# Patient Record
Sex: MALE | Race: WHITE | NOT HISPANIC OR LATINO | URBAN - METROPOLITAN AREA
[De-identification: names, ages, dates, MRNs, and addresses within clinical notes are randomized per-mention and may not be internally consistent; named-entity substitution may affect disease eponyms.]

---

## 2017-06-26 ENCOUNTER — OUTPATIENT (OUTPATIENT)
Dept: OUTPATIENT SERVICES | Facility: HOSPITAL | Age: 27
LOS: 1 days | Discharge: HOME | End: 2017-06-26

## 2017-06-26 DIAGNOSIS — E03.9 HYPOTHYROIDISM, UNSPECIFIED: ICD-10-CM

## 2017-06-26 DIAGNOSIS — A69.20 LYME DISEASE, UNSPECIFIED: ICD-10-CM

## 2017-06-26 DIAGNOSIS — Z00.00 ENCOUNTER FOR GENERAL ADULT MEDICAL EXAMINATION WITHOUT ABNORMAL FINDINGS: ICD-10-CM

## 2017-06-26 DIAGNOSIS — D64.9 ANEMIA, UNSPECIFIED: ICD-10-CM

## 2017-06-26 DIAGNOSIS — Z01.89 ENCOUNTER FOR OTHER SPECIFIED SPECIAL EXAMINATIONS: ICD-10-CM

## 2017-06-26 DIAGNOSIS — E11.69 TYPE 2 DIABETES MELLITUS WITH OTHER SPECIFIED COMPLICATION: ICD-10-CM

## 2017-06-26 DIAGNOSIS — M25.50 PAIN IN UNSPECIFIED JOINT: ICD-10-CM

## 2017-06-26 DIAGNOSIS — N39.0 URINARY TRACT INFECTION, SITE NOT SPECIFIED: ICD-10-CM

## 2017-06-26 DIAGNOSIS — R97.20 ELEVATED PROSTATE SPECIFIC ANTIGEN [PSA]: ICD-10-CM

## 2017-06-26 DIAGNOSIS — E87.8 OTHER DISORDERS OF ELECTROLYTE AND FLUID BALANCE, NOT ELSEWHERE CLASSIFIED: ICD-10-CM

## 2017-06-26 DIAGNOSIS — E55.9 VITAMIN D DEFICIENCY, UNSPECIFIED: ICD-10-CM

## 2017-06-26 DIAGNOSIS — E78.5 HYPERLIPIDEMIA, UNSPECIFIED: ICD-10-CM

## 2017-06-26 DIAGNOSIS — E75.5 OTHER LIPID STORAGE DISORDERS: ICD-10-CM

## 2017-06-26 DIAGNOSIS — I70.91 GENERALIZED ATHEROSCLEROSIS: ICD-10-CM

## 2018-07-04 ENCOUNTER — EMERGENCY (EMERGENCY)
Facility: HOSPITAL | Age: 28
LOS: 1 days | Discharge: HOME | End: 2018-07-04
Attending: EMERGENCY MEDICINE | Admitting: EMERGENCY MEDICINE
Payer: COMMERCIAL

## 2018-07-04 VITALS
OXYGEN SATURATION: 98 % | HEART RATE: 90 BPM | DIASTOLIC BLOOD PRESSURE: 70 MMHG | RESPIRATION RATE: 20 BRPM | TEMPERATURE: 98 F | SYSTOLIC BLOOD PRESSURE: 137 MMHG

## 2018-07-04 VITALS
WEIGHT: 184.97 LBS | HEART RATE: 105 BPM | RESPIRATION RATE: 18 BRPM | SYSTOLIC BLOOD PRESSURE: 135 MMHG | DIASTOLIC BLOOD PRESSURE: 64 MMHG | TEMPERATURE: 97 F | HEIGHT: 60 IN | OXYGEN SATURATION: 100 %

## 2018-07-04 LAB
ALBUMIN SERPL ELPH-MCNC: 4.9 G/DL — SIGNIFICANT CHANGE UP (ref 3.3–5.2)
ALP SERPL-CCNC: 60 U/L — SIGNIFICANT CHANGE UP (ref 40–120)
ALT FLD-CCNC: 96 U/L — HIGH
ANION GAP SERPL CALC-SCNC: 18 MMOL/L — HIGH (ref 5–17)
AST SERPL-CCNC: 37 U/L — SIGNIFICANT CHANGE UP
BASOPHILS # BLD AUTO: 0 K/UL — SIGNIFICANT CHANGE UP (ref 0–0.2)
BASOPHILS NFR BLD AUTO: 0.1 % — SIGNIFICANT CHANGE UP (ref 0–2)
BILIRUB SERPL-MCNC: <0.2 MG/DL — LOW (ref 0.4–2)
BUN SERPL-MCNC: 11 MG/DL — SIGNIFICANT CHANGE UP (ref 8–20)
CALCIUM SERPL-MCNC: 9.2 MG/DL — SIGNIFICANT CHANGE UP (ref 8.6–10.2)
CHLORIDE SERPL-SCNC: 104 MMOL/L — SIGNIFICANT CHANGE UP (ref 98–107)
CO2 SERPL-SCNC: 18 MMOL/L — LOW (ref 22–29)
CREAT SERPL-MCNC: 0.69 MG/DL — SIGNIFICANT CHANGE UP (ref 0.5–1.3)
EOSINOPHIL # BLD AUTO: 0 K/UL — SIGNIFICANT CHANGE UP (ref 0–0.5)
EOSINOPHIL NFR BLD AUTO: 0.2 % — SIGNIFICANT CHANGE UP (ref 0–5)
ETHANOL SERPL-MCNC: 155 MG/DL — SIGNIFICANT CHANGE UP
GLUCOSE SERPL-MCNC: 113 MG/DL — SIGNIFICANT CHANGE UP (ref 70–115)
HCT VFR BLD CALC: 46.7 % — SIGNIFICANT CHANGE UP (ref 42–52)
HGB BLD-MCNC: 16.6 G/DL — SIGNIFICANT CHANGE UP (ref 14–18)
LYMPHOCYTES # BLD AUTO: 1.8 K/UL — SIGNIFICANT CHANGE UP (ref 1–4.8)
LYMPHOCYTES # BLD AUTO: 20 % — SIGNIFICANT CHANGE UP (ref 20–55)
MCHC RBC-ENTMCNC: 32.2 PG — HIGH (ref 27–31)
MCHC RBC-ENTMCNC: 35.5 G/DL — SIGNIFICANT CHANGE UP (ref 32–36)
MCV RBC AUTO: 90.5 FL — SIGNIFICANT CHANGE UP (ref 80–94)
MONOCYTES # BLD AUTO: 0.6 K/UL — SIGNIFICANT CHANGE UP (ref 0–0.8)
MONOCYTES NFR BLD AUTO: 6.2 % — SIGNIFICANT CHANGE UP (ref 3–10)
NEUTROPHILS # BLD AUTO: 6.6 K/UL — SIGNIFICANT CHANGE UP (ref 1.8–8)
NEUTROPHILS NFR BLD AUTO: 73.4 % — HIGH (ref 37–73)
PLATELET # BLD AUTO: 257 K/UL — SIGNIFICANT CHANGE UP (ref 150–400)
POTASSIUM SERPL-MCNC: 4.2 MMOL/L — SIGNIFICANT CHANGE UP (ref 3.5–5.3)
POTASSIUM SERPL-SCNC: 4.2 MMOL/L — SIGNIFICANT CHANGE UP (ref 3.5–5.3)
PROT SERPL-MCNC: 8.7 G/DL — SIGNIFICANT CHANGE UP (ref 6.6–8.7)
RBC # BLD: 5.16 M/UL — SIGNIFICANT CHANGE UP (ref 4.6–6.2)
RBC # FLD: 12 % — SIGNIFICANT CHANGE UP (ref 11–15.6)
SODIUM SERPL-SCNC: 140 MMOL/L — SIGNIFICANT CHANGE UP (ref 135–145)
WBC # BLD: 8.9 K/UL — SIGNIFICANT CHANGE UP (ref 4.8–10.8)
WBC # FLD AUTO: 8.9 K/UL — SIGNIFICANT CHANGE UP (ref 4.8–10.8)

## 2018-07-04 PROCEDURE — 80307 DRUG TEST PRSMV CHEM ANLYZR: CPT

## 2018-07-04 PROCEDURE — 85027 COMPLETE CBC AUTOMATED: CPT

## 2018-07-04 PROCEDURE — 80053 COMPREHEN METABOLIC PANEL: CPT

## 2018-07-04 PROCEDURE — 99284 EMERGENCY DEPT VISIT MOD MDM: CPT

## 2018-07-04 PROCEDURE — 36415 COLL VENOUS BLD VENIPUNCTURE: CPT

## 2018-07-04 PROCEDURE — 99285 EMERGENCY DEPT VISIT HI MDM: CPT

## 2018-07-04 RX ORDER — LORATADINE 10 MG/1
10 TABLET ORAL DAILY
Qty: 0 | Refills: 0 | Status: DISCONTINUED | OUTPATIENT
Start: 2018-07-04 | End: 2018-07-09

## 2018-07-04 NOTE — ED ADULT TRIAGE NOTE - CHIEF COMPLAINT QUOTE
Pt axox3 BIBA found unresponsive In car by [employer], given 4 mg of narcan with + response. Pt states he has been drinking beers all day and only took x 1 oxycodone.  Pt placed in yellow gown, cooperative with staff. Pt axox3 BIBA found unresponsive In car by [employer], given 4 mg of narcan with + response. Pt states he has been drinking beers all day and only took x 1 Oxycontin 30 mg .  Pt refusing yellow gown at this time.

## 2018-07-04 NOTE — ED ADULT NURSE REASSESSMENT NOTE - NS ED NURSE REASSESS COMMENT FT1
Pt cooperating with staff, placed in yellow gown, and attempting to call " friend" who may have his belongings. Pt getting agitated that friend is not picking up. [facility] [employer] called, pt stating he wants to press charges for possible stolen property .

## 2018-07-04 NOTE — ED PROVIDER NOTE - OBJECTIVE STATEMENT
28 y/o male BIBA presents to the ED s/p overdose which onset today. According to pt, he had taken 1 pill of oxycodone from his friend, the next thing he remembers is waking up in the ambulance. Pt was given Narcan by EMS. He states that he does not use any street drugs. Denies HA, numbness, tingling, SOB, difficulty breathing, or CP. Pt notes that his friend has all of his items.  No further complaints at this time. 28 y/o male BIBA presents to the ED s/p overdose which onset today. According to pt, he had taken 1 pill of oxycodone from his friend, the next thing he remembers is waking up in the ambulance. Pt was given Narcan by EMS. He states that he does not use any street drugs. Denies HA, numbness, tingling, SOB, difficulty breathing, or CP. Pt notes that his friend has all of his items.  According to PD, pt was foaming from mouth. No further complaints at this time. This patient is a 26 y/o man BIBA after reports of an overdose today.  According to pt, he had taken 1 pill of oxycodone from his friend, the next thing he remembers is waking up in the ambulance.  EMS states that he was found unresponsive in the car and was given narcan with a positive response.  Patient denies use of heroin.  He has no complaints of pain.  Triage nurse states that police reported the patient to be foaming from the mouth when he was found unresponsive.  Patient denies hx of seizures.

## 2018-07-04 NOTE — ED ADULT NURSE NOTE - CHIEF COMPLAINT QUOTE
Pt axox3 BIBA found unresponsive In car by [employer], given 4 mg of narcan with + response. Pt states he has been drinking beers all day and only took x 1 Oxycontin 30 mg .  Pt refusing yellow gown at this time.

## 2018-07-04 NOTE — ED PROVIDER NOTE - NEUROLOGICAL, MLM
Alert and oriented, no focal deficits, no motor or sensory deficits. Alert and oriented, no focal deficits, no motor or sensory deficits.  GCS 15.

## 2018-07-04 NOTE — ED ADULT NURSE REASSESSMENT NOTE - NS ED NURSE REASSESS COMMENT FT1
Assumed patient care from DEE Renae, charting as noted. Received patient lying in bed, on a yellow gown. Patient denies any pain or discomfort at this time. Patient noted to be anxious, redirection given with fair effect. Patient received with no iv access. Plan of care and wait time explained, patient verbalized understanding. Patient not in distress. To continue to monitor.

## 2018-07-04 NOTE — ED PROVIDER NOTE - MEDICAL DECISION MAKING DETAILS
Pt reporting oxycodone use, unresponsive in vehicle. With EMS reporting responsiveness to Narcan . EtOh on breath. No hypoxia. Will wait for friend to arrive to  pt. Pt reporting oxycodone use 1 pill but was found unresponsive in vehicle; EMS reporting responsiveness to Narcan . EtOh on breath. No hypoxia.  Due to co-ingestions with alcohol and narcan needing to be given will check labs and observe patient in ER for signs of respiratory depression.

## 2018-07-04 NOTE — ED PROVIDER NOTE - PROGRESS NOTE DETAILS
Pt wants to leave the ED. Pt held secondary to alcohol on breath and having been rescued with narcan. Pt understands and is aware of need to observe until his alcohol level is below 100. Will wait for 2 hours after labs were obtained, as pt alcohol level is 155.

## 2018-11-09 ENCOUNTER — APPOINTMENT (OUTPATIENT)
Dept: INTERNAL MEDICINE | Facility: CLINIC | Age: 28
End: 2018-11-09
Payer: MEDICAID

## 2018-11-09 DIAGNOSIS — R21 RASH AND OTHER NONSPECIFIC SKIN ERUPTION: ICD-10-CM

## 2018-11-09 DIAGNOSIS — B19.20 UNSPECIFIED VIRAL HEPATITIS C W/OUT HEPATIC COMA: ICD-10-CM

## 2018-11-09 DIAGNOSIS — F17.210 NICOTINE DEPENDENCE, CIGARETTES, UNCOMPLICATED: ICD-10-CM

## 2018-11-09 PROCEDURE — 99406 BEHAV CHNG SMOKING 3-10 MIN: CPT

## 2018-11-09 PROCEDURE — 99385 PREV VISIT NEW AGE 18-39: CPT | Mod: 25

## 2018-11-09 PROCEDURE — G0442 ANNUAL ALCOHOL SCREEN 15 MIN: CPT

## 2018-11-09 NOTE — REVIEW OF SYSTEMS
[Fever] : no fever [Night Sweats] : no night sweats [Chest Pain] : no chest pain [Shortness Of Breath] : no shortness of breath [Wheezing] : no wheezing [Cough] : no cough [Abdominal Pain] : no abdominal pain [Nausea] : no nausea [Vomiting] : no vomiting [de-identified] : gets recurrent rash torso, not itchy [FreeTextEntry1] : no tiredness, weakness.

## 2018-11-09 NOTE — HISTORY OF PRESENT ILLNESS
[FreeTextEntry1] : 1s t visit [de-identified] : Is the first visit here for this 28-year-old male. He was diagnosed as having hepatitis C one year ago. I am referring him to see gastroenterology.\par \par He also has a history of a recurrent rash. I am referring him to see dermatology.\par \par He did smoke a half a pack per day and continues to for 10 years. He was couinseled on cessation. See below\par \par He tells me he did drink a lot in the past, may be alcoholic, and has been off alcohol for one year.

## 2018-11-09 NOTE — COUNSELING
[Tobacco Use Cessation Intermediate Greater Than 3 Minutes Up to 10 Minutes] : Tobacco Use Cessation Intermediate Greater Than 3 Minutes Up to 10 Minutes [ - Annual Alcohol Misuse Screening] : Annual Alcohol Misuse Screening

## 2018-11-09 NOTE — HEALTH RISK ASSESSMENT
[de-identified] : 1/2 a pack for the past 10 years  [Employed] : employed [Smoke Detector] : smoke detector [Carbon Monoxide Detector] : carbon monoxide detector [Guns at Home] : guns at home [Safety elements used in home] : safety elements used in home [Seat Belt] :  uses seat belt [Sunscreen] : uses sunscreen [FreeTextEntry2] :  [] : No [de-identified] : off 1 year

## 2018-11-09 NOTE — PHYSICAL EXAM
[No Acute Distress] : no acute distress [Well Nourished] : well nourished [Well Developed] : well developed [Normal Sclera/Conjunctiva] : normal sclera/conjunctiva [PERRL] : pupils equal round and reactive to light [Normal Outer Ear/Nose] : the outer ears and nose were normal in appearance [Normal Oropharynx] : the oropharynx was normal [No JVD] : no jugular venous distention [Supple] : supple [No Lymphadenopathy] : no lymphadenopathy [No Respiratory Distress] : no respiratory distress  [Clear to Auscultation] : lungs were clear to auscultation bilaterally [No Accessory Muscle Use] : no accessory muscle use [Normal Rate] : normal rate  [Regular Rhythm] : with a regular rhythm [Normal S1, S2] : normal S1 and S2 [No Murmur] : no murmur heard [No Edema] : there was no peripheral edema [No Extremity Clubbing/Cyanosis] : no extremity clubbing/cyanosis [Soft] : abdomen soft [Non Tender] : non-tender [Non-distended] : non-distended [No Masses] : no abdominal mass palpated [No HSM] : no HSM [Normal Bowel Sounds] : normal bowel sounds [Normal Anterior Cervical Nodes] : no anterior cervical lymphadenopathy [No Rash] : no rash [Normal Gait] : normal gait [No Focal Deficits] : no focal deficits [Normal Affect] : the affect was normal [Normal Insight/Judgement] : insight and judgment were intact

## 2018-11-09 NOTE — HISTORY OF PRESENT ILLNESS
[FreeTextEntry1] : 1s t visit [de-identified] : Is the first visit here for this 28-year-old male. He was diagnosed as having hepatitis C one year ago. I am referring him to see gastroenterology.\par \par He also has a history of a recurrent rash. I am referring him to see dermatology.\par \par He did smoke a half a pack per day and continues to for 10 years. He was couinseled on cessation. See below\par \par He tells me he did drink a lot in the past, may be alcoholic, and has been off alcohol for one year.

## 2018-11-09 NOTE — ASSESSMENT
[FreeTextEntry1] : #1 current cigarette smoker. he was counseled on cessation. Recommended he pick a quit date. Try with lozenges as needed.  reminded not to buy cigarettes going forward. Time spent on counseling on cigarette smoking cessation was 6 minutes.\par \par #2 history of hepatitis C. Diagnosis one year ago. Refer to DR Singleton, gastroenterology.\par \par #3 history of recurrent rash on torso.  refer to dermatology.\par \par #4 health maintenance. Pt refusing flu vac. for genearl labs by gastroenterology. \par \par

## 2018-11-09 NOTE — REVIEW OF SYSTEMS
[Fever] : no fever [Night Sweats] : no night sweats [Chest Pain] : no chest pain [Shortness Of Breath] : no shortness of breath [Wheezing] : no wheezing [Cough] : no cough [Abdominal Pain] : no abdominal pain [Nausea] : no nausea [Vomiting] : no vomiting [de-identified] : gets recurrent rash torso, not itchy [FreeTextEntry1] : no tiredness, weakness.

## 2019-03-01 ENCOUNTER — INPATIENT (INPATIENT)
Facility: HOSPITAL | Age: 29
LOS: 6 days | Discharge: HOME | End: 2019-03-08
Attending: FAMILY MEDICINE | Admitting: FAMILY MEDICINE
Payer: MEDICAID

## 2019-03-01 VITALS
WEIGHT: 184.97 LBS | RESPIRATION RATE: 15 BRPM | TEMPERATURE: 98 F | SYSTOLIC BLOOD PRESSURE: 160 MMHG | HEIGHT: 70 IN | HEART RATE: 105 BPM | OXYGEN SATURATION: 100 % | DIASTOLIC BLOOD PRESSURE: 91 MMHG

## 2019-03-01 LAB
AMPHET UR-MCNC: NEGATIVE — SIGNIFICANT CHANGE UP
ANION GAP SERPL CALC-SCNC: 11 MMOL/L — SIGNIFICANT CHANGE UP (ref 5–17)
ANION GAP SERPL CALC-SCNC: 9 MMOL/L — SIGNIFICANT CHANGE UP (ref 5–17)
APPEARANCE UR: CLEAR — SIGNIFICANT CHANGE UP
BACTERIA # UR AUTO: ABNORMAL
BARBITURATES UR SCN-MCNC: NEGATIVE — SIGNIFICANT CHANGE UP
BASOPHILS # BLD AUTO: 0.02 K/UL — SIGNIFICANT CHANGE UP (ref 0–0.2)
BASOPHILS NFR BLD AUTO: 0.2 % — SIGNIFICANT CHANGE UP (ref 0–2)
BENZODIAZ UR-MCNC: POSITIVE — SIGNIFICANT CHANGE UP
BILIRUB UR-MCNC: NEGATIVE — SIGNIFICANT CHANGE UP
BUN SERPL-MCNC: 11 MG/DL — SIGNIFICANT CHANGE UP (ref 7–23)
BUN SERPL-MCNC: 13 MG/DL — SIGNIFICANT CHANGE UP (ref 7–23)
CALCIUM SERPL-MCNC: 7.9 MG/DL — LOW (ref 8.5–10.1)
CALCIUM SERPL-MCNC: 8.7 MG/DL — SIGNIFICANT CHANGE UP (ref 8.5–10.1)
CHLORIDE SERPL-SCNC: 100 MMOL/L — SIGNIFICANT CHANGE UP (ref 96–108)
CHLORIDE SERPL-SCNC: 104 MMOL/L — SIGNIFICANT CHANGE UP (ref 96–108)
CK SERPL-CCNC: CRITICAL HIGH U/L (ref 26–308)
CO2 SERPL-SCNC: 23 MMOL/L — SIGNIFICANT CHANGE UP (ref 22–31)
CO2 SERPL-SCNC: 24 MMOL/L — SIGNIFICANT CHANGE UP (ref 22–31)
COCAINE METAB.OTHER UR-MCNC: NEGATIVE — SIGNIFICANT CHANGE UP
COLOR SPEC: ABNORMAL
CREAT SERPL-MCNC: 0.67 MG/DL — SIGNIFICANT CHANGE UP (ref 0.5–1.3)
CREAT SERPL-MCNC: 0.7 MG/DL — SIGNIFICANT CHANGE UP (ref 0.5–1.3)
DIFF PNL FLD: ABNORMAL
EOSINOPHIL # BLD AUTO: 0 K/UL — SIGNIFICANT CHANGE UP (ref 0–0.5)
EOSINOPHIL NFR BLD AUTO: 0 % — SIGNIFICANT CHANGE UP (ref 0–6)
EPI CELLS # UR: SIGNIFICANT CHANGE UP
GLUCOSE SERPL-MCNC: 108 MG/DL — HIGH (ref 70–99)
GLUCOSE SERPL-MCNC: 120 MG/DL — HIGH (ref 70–99)
GLUCOSE UR QL: NEGATIVE MG/DL — SIGNIFICANT CHANGE UP
HCT VFR BLD CALC: 44.5 % — SIGNIFICANT CHANGE UP (ref 39–50)
HCT VFR BLD CALC: 45.2 % — SIGNIFICANT CHANGE UP (ref 39–50)
HGB BLD-MCNC: 16.1 G/DL — SIGNIFICANT CHANGE UP (ref 13–17)
HGB BLD-MCNC: 16.2 G/DL — SIGNIFICANT CHANGE UP (ref 13–17)
IMM GRANULOCYTES NFR BLD AUTO: 0.3 % — SIGNIFICANT CHANGE UP (ref 0–1.5)
KETONES UR-MCNC: ABNORMAL
LACTATE SERPL-SCNC: 0.7 MMOL/L — SIGNIFICANT CHANGE UP (ref 0.7–2)
LEUKOCYTE ESTERASE UR-ACNC: ABNORMAL
LYMPHOCYTES # BLD AUTO: 1.28 K/UL — SIGNIFICANT CHANGE UP (ref 1–3.3)
LYMPHOCYTES # BLD AUTO: 10.4 % — LOW (ref 13–44)
MCHC RBC-ENTMCNC: 31.3 PG — SIGNIFICANT CHANGE UP (ref 27–34)
MCHC RBC-ENTMCNC: 31.6 PG — SIGNIFICANT CHANGE UP (ref 27–34)
MCHC RBC-ENTMCNC: 35.6 GM/DL — SIGNIFICANT CHANGE UP (ref 32–36)
MCHC RBC-ENTMCNC: 36.4 GM/DL — HIGH (ref 32–36)
MCV RBC AUTO: 85.9 FL — SIGNIFICANT CHANGE UP (ref 80–100)
MCV RBC AUTO: 88.6 FL — SIGNIFICANT CHANGE UP (ref 80–100)
METHADONE UR-MCNC: NEGATIVE — SIGNIFICANT CHANGE UP
MONOCYTES # BLD AUTO: 1.39 K/UL — HIGH (ref 0–0.9)
MONOCYTES NFR BLD AUTO: 11.3 % — SIGNIFICANT CHANGE UP (ref 2–14)
NEUTROPHILS # BLD AUTO: 9.57 K/UL — HIGH (ref 1.8–7.4)
NEUTROPHILS NFR BLD AUTO: 77.8 % — HIGH (ref 43–77)
NITRITE UR-MCNC: POSITIVE
NRBC # BLD: 0 /100 WBCS — SIGNIFICANT CHANGE UP (ref 0–0)
NRBC # BLD: 0 /100 WBCS — SIGNIFICANT CHANGE UP (ref 0–0)
OPIATES UR-MCNC: POSITIVE — SIGNIFICANT CHANGE UP
PCP SPEC-MCNC: SIGNIFICANT CHANGE UP
PCP UR-MCNC: NEGATIVE — SIGNIFICANT CHANGE UP
PH UR: 6.5 — SIGNIFICANT CHANGE UP (ref 5–8)
PLATELET # BLD AUTO: 201 K/UL — SIGNIFICANT CHANGE UP (ref 150–400)
PLATELET # BLD AUTO: 243 K/UL — SIGNIFICANT CHANGE UP (ref 150–400)
POTASSIUM SERPL-MCNC: 3.8 MMOL/L — SIGNIFICANT CHANGE UP (ref 3.5–5.3)
POTASSIUM SERPL-MCNC: 4.2 MMOL/L — SIGNIFICANT CHANGE UP (ref 3.5–5.3)
POTASSIUM SERPL-SCNC: 3.8 MMOL/L — SIGNIFICANT CHANGE UP (ref 3.5–5.3)
POTASSIUM SERPL-SCNC: 4.2 MMOL/L — SIGNIFICANT CHANGE UP (ref 3.5–5.3)
PROT UR-MCNC: 100 MG/DL
RBC # BLD: 5.1 M/UL — SIGNIFICANT CHANGE UP (ref 4.2–5.8)
RBC # BLD: 5.18 M/UL — SIGNIFICANT CHANGE UP (ref 4.2–5.8)
RBC # FLD: 11.9 % — SIGNIFICANT CHANGE UP (ref 10.3–14.5)
RBC # FLD: 11.9 % — SIGNIFICANT CHANGE UP (ref 10.3–14.5)
RBC CASTS # UR COMP ASSIST: NEGATIVE /HPF — SIGNIFICANT CHANGE UP (ref 0–4)
SODIUM SERPL-SCNC: 134 MMOL/L — LOW (ref 135–145)
SODIUM SERPL-SCNC: 137 MMOL/L — SIGNIFICANT CHANGE UP (ref 135–145)
SP GR SPEC: 1.01 — SIGNIFICANT CHANGE UP (ref 1.01–1.02)
THC UR QL: NEGATIVE — SIGNIFICANT CHANGE UP
UROBILINOGEN FLD QL: 1 MG/DL
WBC # BLD: 12.3 K/UL — HIGH (ref 3.8–10.5)
WBC # BLD: 8.4 K/UL — SIGNIFICANT CHANGE UP (ref 3.8–10.5)
WBC # FLD AUTO: 12.3 K/UL — HIGH (ref 3.8–10.5)
WBC # FLD AUTO: 8.4 K/UL — SIGNIFICANT CHANGE UP (ref 3.8–10.5)
WBC UR QL: SIGNIFICANT CHANGE UP

## 2019-03-01 PROCEDURE — 99223 1ST HOSP IP/OBS HIGH 75: CPT

## 2019-03-01 PROCEDURE — 99285 EMERGENCY DEPT VISIT HI MDM: CPT

## 2019-03-01 PROCEDURE — 71045 X-RAY EXAM CHEST 1 VIEW: CPT | Mod: 26

## 2019-03-01 PROCEDURE — 93970 EXTREMITY STUDY: CPT | Mod: 26

## 2019-03-01 PROCEDURE — 93010 ELECTROCARDIOGRAM REPORT: CPT

## 2019-03-01 RX ORDER — SODIUM CHLORIDE 9 MG/ML
2000 INJECTION INTRAMUSCULAR; INTRAVENOUS; SUBCUTANEOUS ONCE
Qty: 0 | Refills: 0 | Status: COMPLETED | OUTPATIENT
Start: 2019-03-01 | End: 2019-03-01

## 2019-03-01 RX ORDER — ACETAMINOPHEN 500 MG
650 TABLET ORAL EVERY 6 HOURS
Qty: 0 | Refills: 0 | Status: DISCONTINUED | OUTPATIENT
Start: 2019-03-01 | End: 2019-03-08

## 2019-03-01 RX ORDER — AMLODIPINE BESYLATE 2.5 MG/1
5 TABLET ORAL AT BEDTIME
Qty: 0 | Refills: 0 | Status: DISCONTINUED | OUTPATIENT
Start: 2019-03-01 | End: 2019-03-04

## 2019-03-01 RX ORDER — ENOXAPARIN SODIUM 100 MG/ML
40 INJECTION SUBCUTANEOUS EVERY 24 HOURS
Qty: 0 | Refills: 0 | Status: DISCONTINUED | OUTPATIENT
Start: 2019-03-01 | End: 2019-03-08

## 2019-03-01 RX ORDER — NICOTINE POLACRILEX 2 MG
1 GUM BUCCAL DAILY
Qty: 0 | Refills: 0 | Status: DISCONTINUED | OUTPATIENT
Start: 2019-03-01 | End: 2019-03-08

## 2019-03-01 RX ORDER — INFLUENZA VIRUS VACCINE 15; 15; 15; 15 UG/.5ML; UG/.5ML; UG/.5ML; UG/.5ML
0.5 SUSPENSION INTRAMUSCULAR ONCE
Qty: 0 | Refills: 0 | Status: COMPLETED | OUTPATIENT
Start: 2019-03-01 | End: 2019-03-01

## 2019-03-01 RX ORDER — BUPRENORPHINE AND NALOXONE 2; .5 MG/1; MG/1
1 TABLET SUBLINGUAL EVERY 4 HOURS
Qty: 0 | Refills: 0 | Status: DISCONTINUED | OUTPATIENT
Start: 2019-03-01 | End: 2019-03-02

## 2019-03-01 RX ORDER — SODIUM CHLORIDE 9 MG/ML
1000 INJECTION INTRAMUSCULAR; INTRAVENOUS; SUBCUTANEOUS ONCE
Qty: 0 | Refills: 0 | Status: DISCONTINUED | OUTPATIENT
Start: 2019-03-01 | End: 2019-03-01

## 2019-03-01 RX ORDER — SODIUM BICARBONATE 1 MEQ/ML
0.13 SYRINGE (ML) INTRAVENOUS
Qty: 75 | Refills: 0 | Status: DISCONTINUED | OUTPATIENT
Start: 2019-03-01 | End: 2019-03-04

## 2019-03-01 RX ORDER — LANOLIN ALCOHOL/MO/W.PET/CERES
5 CREAM (GRAM) TOPICAL ONCE
Qty: 0 | Refills: 0 | Status: COMPLETED | OUTPATIENT
Start: 2019-03-01 | End: 2019-03-01

## 2019-03-01 RX ORDER — CLONAZEPAM 1 MG
1 TABLET ORAL
Qty: 0 | Refills: 0 | Status: DISCONTINUED | OUTPATIENT
Start: 2019-03-01 | End: 2019-03-08

## 2019-03-01 RX ORDER — PROCHLORPERAZINE MALEATE 5 MG
10 TABLET ORAL EVERY 8 HOURS
Qty: 0 | Refills: 0 | Status: DISCONTINUED | OUTPATIENT
Start: 2019-03-01 | End: 2019-03-08

## 2019-03-01 RX ORDER — SODIUM CHLORIDE 9 MG/ML
1000 INJECTION INTRAMUSCULAR; INTRAVENOUS; SUBCUTANEOUS
Qty: 0 | Refills: 0 | Status: DISCONTINUED | OUTPATIENT
Start: 2019-03-01 | End: 2019-03-01

## 2019-03-01 RX ORDER — LOPERAMIDE HCL 2 MG
2 TABLET ORAL EVERY 4 HOURS
Qty: 0 | Refills: 0 | Status: DISCONTINUED | OUTPATIENT
Start: 2019-03-01 | End: 2019-03-08

## 2019-03-01 RX ORDER — CLONAZEPAM 1 MG
1 TABLET ORAL
Qty: 0 | Refills: 0 | COMMUNITY

## 2019-03-01 RX ORDER — SODIUM CHLORIDE 9 MG/ML
1000 INJECTION INTRAMUSCULAR; INTRAVENOUS; SUBCUTANEOUS ONCE
Qty: 0 | Refills: 0 | Status: COMPLETED | OUTPATIENT
Start: 2019-03-01 | End: 2019-03-01

## 2019-03-01 RX ADMIN — SODIUM CHLORIDE 1000 MILLILITER(S): 9 INJECTION INTRAMUSCULAR; INTRAVENOUS; SUBCUTANEOUS at 17:30

## 2019-03-01 RX ADMIN — AMLODIPINE BESYLATE 5 MILLIGRAM(S): 2.5 TABLET ORAL at 18:50

## 2019-03-01 RX ADMIN — ENOXAPARIN SODIUM 40 MILLIGRAM(S): 100 INJECTION SUBCUTANEOUS at 18:35

## 2019-03-01 RX ADMIN — Medication 150 MEQ/KG/HR: at 22:39

## 2019-03-01 RX ADMIN — Medication 1 PATCH: at 22:37

## 2019-03-01 RX ADMIN — SODIUM CHLORIDE 2000 MILLILITER(S): 9 INJECTION INTRAMUSCULAR; INTRAVENOUS; SUBCUTANEOUS at 15:25

## 2019-03-01 RX ADMIN — SODIUM CHLORIDE 2000 MILLILITER(S): 9 INJECTION INTRAMUSCULAR; INTRAVENOUS; SUBCUTANEOUS at 12:50

## 2019-03-01 RX ADMIN — Medication 1 MILLIGRAM(S): at 18:35

## 2019-03-01 RX ADMIN — Medication 650 MILLIGRAM(S): at 18:46

## 2019-03-01 RX ADMIN — SODIUM CHLORIDE 2000 MILLILITER(S): 9 INJECTION INTRAMUSCULAR; INTRAVENOUS; SUBCUTANEOUS at 15:12

## 2019-03-01 NOTE — ED ADULT NURSE NOTE - NSIMPLEMENTINTERV_GEN_ALL_ED
Implemented All Fall Risk Interventions:  [place] to call system. Call bell, personal items and telephone within reach. Instruct patient to call for assistance. Room bathroom lighting operational. Non-slip footwear when patient is off stretcher. Physically safe environment: no spills, clutter or unnecessary equipment. Stretcher in lowest position, wheels locked, appropriate side rails in place. Provide visual cue, wrist band, yellow gown, etc. Monitor gait and stability. Monitor for mental status changes and reorient to person, place, and time. Review medications for side effects contributing to fall risk. Reinforce activity limits and safety measures with patient and family.

## 2019-03-01 NOTE — ED ADULT NURSE NOTE - CHIEF COMPLAINT QUOTE
pt reports he shot heroin in his room last night and passed out sitting on the floor, woke up this morning with numbness and tingling in his legs, unable to ambulate.

## 2019-03-01 NOTE — ED PROVIDER NOTE - NS_ ATTENDINGSCRIBEDETAILS _ED_A_ED_FT
I, Juan Wiggins MD,  performed the initial face to face bedside interview with this patient regarding history of present illness, review of symptoms and relevant past medical, social and family history.  I completed an independent physical examination.    The history, relevant review of systems, past medical and surgical history, medical decision making, and physical examination was documented by the scribe in my presence and I attest to the accuracy of the documentation.

## 2019-03-01 NOTE — ED PROVIDER NOTE - PROGRESS NOTE DETAILS
Jose Alfredo Gallagher MD, PGY3: +elevated CK. will give IVF and admit. Hospitalist requesting urine drug screen and lactate.

## 2019-03-01 NOTE — PATIENT PROFILE ADULT - NSPROEDALEARNPREF_GEN_A_NUR
group instruction/video/written material/individual instruction/pictorial/verbal instruction/audio/computer/internet/skill demonstration

## 2019-03-01 NOTE — ED PROVIDER NOTE - ATTENDING CONTRIBUTION TO CARE
I, Juan Wiggins MD,  performed the initial face to face bedside interview with this patient regarding history of present illness, review of symptoms and relevant past medical, social and family history.  I completed an independent physical examination.  I was the initial provider who evaluated this patient. I have signed out the follow up of any pending tests (i.e. labs, radiological studies) to the resident.  I have communicated the patient’s plan of care and disposition with the resident.

## 2019-03-01 NOTE — CONSULT NOTE ADULT - SUBJECTIVE AND OBJECTIVE BOX
28M.  presenteing to ED after heroin overdose.  In his room last night and passed out sitting on the floor.  He woke up in the morning w/ numbness and tingling in his legs.  Was unable to ambulate. Continues to c/o b/l foot numbness and pain.   n ED, CPK 33K.  given 2L of isotonic saline.  Renal eval for rhabdomyloysis    today - pt seen earlier today and case d.w Dr D'Amico   lethargic arousable  mother at bedside  pt c/o (+) dark urine, L foot numbness, unable to move feet       PAST MEDICAL & SURGICAL HISTORY:    Home Medications:  clonazePAM 1 mg oral tablet: 1 tab(s) orally 2 times a day (01 Mar 2019 13:50)    MEDICATIONS  (STANDING):  amLODIPine   Tablet 5 milliGRAM(s) Oral at bedtime  clonazePAM Tablet 1 milliGRAM(s) Oral two times a day  enoxaparin Injectable 40 milliGRAM(s) SubCutaneous every 24 hours  nicotine -  14 mG/24Hr(s) Patch 1 patch Transdermal daily  sodium chloride 0.9%. 1000 milliLiter(s) (200 mL/Hr) IV Continuous <Continuous>      Allergies    No Known Allergies    Intolerances        SOCIAL HISTORY:  + smoke  + IVDA    FAMILY HISTORY:  not contributory     REVIEW OF SYSTEMS:    CONSTITUTIONAL: No fevers or chills  EYES/ENT: No visual changes;  No vertigo or throat pain   NECK: No pain or stiffness  RESPIRATORY: No cough, wheezing, hemoptysis; No shortness of breath  CARDIOVASCULAR: No chest pain or palpitations  GASTROINTESTINAL: No abdominal or epigastric pain. No nausea, vomiting, or hematemesis; No diarrhea or constipation. No melena or hematochezia.  GENITOURINARY: No dysuria, frequency or hematuria  NEUROLOGICAL: + numbness and weakness   SKIN: No itching, burning, rashes, or lesions   All other review of systems is negative unless indicated above.    VITAL:  Vital Signs Last 24 Hrs  T(C): 36.8 (01 Mar 2019 18:31), Max: 37.2 (01 Mar 2019 17:25)  T(F): 98.3 (01 Mar 2019 18:31), Max: 98.9 (01 Mar 2019 17:25)  HR: 90 (01 Mar 2019 18:31) (84 - 105)  BP: 167/90 (01 Mar 2019 18:31) (152/91 - 167/90)  BP(mean): --  RR: 17 (01 Mar 2019 18:31) (15 - 17)  SpO2: 100% (01 Mar 2019 18:31) (96% - 100%)    PHYSICAL EXAM:    Constitutional: NAD  HEENT: EOMI,  MMM  Neck: No LAD, No JVD  Respiratory: CTAB  Cardiovascular: S1 and S2  Gastrointestinal: BS+, soft, NT/ND  Extremities: No peripheral edema  Neurological: lethrgic, umable to move lower ext bilaterally  tender at hip regions  no eccymoses    : No Aguilera  Skin: No rashes  Access: Not applicable    LABS:               137    |  104    |  11     ----------------------------<  108       01 Mar 2019 15:43  3.8     |  24     |  0.67     134    |  100    |  13     ----------------------------<  120       01 Mar 2019 09:48  4.2     |  23     |  0.70     Ca    7.9        01 Mar 2019 15:43  Ca    8.7        01 Mar 2019 09:48      Creatine Kinase, Serum: 16965: Test Name:_  Called by:_charles vela Rn  Called to:_  Read back 2 Pt IDs:y_ Read back values:_y Date/Tm:_03/01/19 17:11 U/L (03.01.19 @ 15:43)    Urine Microscopic-Add On (NC) (03.01.19 @ 15:45)    Red Blood Cell - Urine: Negative /HPF    White Blood Cell - Urine: 0-2    Bacteria: Occasional    Epithelial Cells: Occasional    Urinalysis (03.01.19 @ 15:45)    Glucose Qualitative, Urine: Negative mg/dL    Blood, Urine: Large    pH Urine: 6.5    Color: Zara    Urine Appearance: Clear    Bilirubin: Negative    Ketone - Urine: Moderate    Specific Gravity: 1.015    Protein, Urine: 100 mg/dL    Urobilinogen: 1 mg/dL    Nitrite: Positive    Leukocyte Esterase Concentration: Trace      RADIOLOGY & ADDITIONAL STUDIES:

## 2019-03-01 NOTE — CONSULT NOTE ADULT - ASSESSMENT
Assessment/Plan:  28M heroin overdose with Bilateral lower extremity swelling/numbness/tingling.  - No clinical findings at this time concerning for compartment syndrome, will continue to follow and reassess  - Trend CPK, hydration with NS  - Monitor K  - will follow    discussed with Dr. Blackburn

## 2019-03-01 NOTE — ED ADULT NURSE REASSESSMENT NOTE - NS ED NURSE REASSESS COMMENT FT1
Pt. is sleeping at this time- Patient's father verbalizes plan of care- safety maintained--Oxygen saturation monitor in place-
pt received. alert and oriented. no signs of distress. breathing even and unlabored. hospitalist at bedside. INF infusing as per order. will monitor.
Pt currently receiving IVF for elevated CPK, vss, family at bedside aware of POC to be admitted. Hourly rounding completed, rn to continue to monitor.

## 2019-03-01 NOTE — CONSULT NOTE ADULT - SUBJECTIVE AND OBJECTIVE BOX
Patient is a 28y old Male who presents with a chief complaint of heroin overdose and bilateral numbness and tingling in bilateral lower extremities.  Patient admits used heroin at approximately 1 am last night and was passed out in seated position until waking at 6am.  Patient admits woke up with numbness in bilateral lower extremities, unable to ambulate.    PMH:  Denies  PSH:  Denies  Meds:  Denies  NKDA  Social:  + heroin, + etoh    PE:  Gen:  NAD, AOX3  HEENT:  EOMI  CV:  s1 s2  Pulm:  bilateral chest rise  Abd:  soft, nontender  Ext:  RLE:  No edema, nontender, sensation intact throughout, 2+ DP/PT pulses, <3 sec capillary refill, warm, pink, compartments soft  LLE:  mild edema, nontender, denies sensation in lateral forefoot and at hallux.  2+ DP/PT pulses bilateral, compartments soft, <3 sec capillary refill, warm, pink, small abrasion at lateral malleoulus    CPK:  33K  K:  4.2

## 2019-03-01 NOTE — H&P ADULT - HISTORY OF PRESENT ILLNESS
28M.  admitted 03/01/19.  presented to ED after heroin overdose.  was in his room last night and passed out sitting on the floor.  he woke up in the morning w/ numbness and tingling in his legs.  was unable to ambulate.  continues to c/o b/l foot numbness and pain.  heroin was injected into his R arm (he denies injecting in his legs or feet).  in ED, CPK 33K.  given 2L of isotonic saline.    ROS:  (+) dark urine, L foot numbness, unable to bear weight.  (-) SI.    PMHx:  anxiety;  polysubstance abuse.    PSHx:  clonazepam.    ALL:  NKDA.    Rx:  denies.    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  (+) IVDA.  prior attempts at rehab.  (+) tobacco.  (+) EtOH.    FHx:  (+) father AMI 40s.

## 2019-03-01 NOTE — SBIRT NOTE. - NSSBIRTSERVICES_GEN_A_ED_FT
Meeting with patient attempted however Full Screen Not Performed due to  Severity of illness/mental status; health  will return to complete SBIRT service by 1300, RN aware.

## 2019-03-01 NOTE — ED PROVIDER NOTE - MUSCULOSKELETAL, MLM
old injection marks to the right AC, NVI distally, left hand swelling secondary to heroin injection, 2+ pedal pulses

## 2019-03-01 NOTE — SBIRT NOTE. - NSSBIRTFULLSCREEN_GEN_A_ED_FT
Naloxone Rescue Kit dispensed: Patient's parents were educated about Naloxone and trained on how to administer the medication/single-step nasal spray.

## 2019-03-01 NOTE — H&P ADULT - ASSESSMENT
28M.  admitted 03/01/19.  presented to ED after heroin overdose.  was in his room last night and passed out sitting on the floor.  he woke up in the morning w/ numbness and tingling in his legs.  was unable to ambulate.  continues to c/o b/l foot numbness and pain.  heroin was injected into his R arm (he denies injecting in his legs or feet).  in ED, CPK 33K.  given 2L of isotonic saline.    PMHx:  anxiety;  polysubstance abuse.    heroin overdose.    rhabdomyolysis due to prolonged immobility.    LE pain, numbness and tingling.    hx anxiety.    -admit to the [facility] medical [facility].  -monitor urine output.  -serial CPK and BMP.  -check UA and urine myoglobin.  -give another 2L of NS.  if urine myoglobin (+), will add HCO3.  -patient examined at bedside w/ surgery.  no clinical evidence of compartment syndrome.  -Nephrology + Neurology.  -SW + Case Management.

## 2019-03-01 NOTE — ED ADULT NURSE NOTE - TEMPLATE LIST FOR HEAD TO TOE ASSESSMENT
Letter printed and a message left conveying to Zonia that a letter was at the  for pickup.    General

## 2019-03-01 NOTE — ED PROVIDER NOTE - OBJECTIVE STATEMENT
Pt is a 29 y/o M presenting to the ED s/p overdose. Pt states he relapsed on heroin two days ago. He used shot up last night, fell asleep in a seated hunched over position, and woke up on the floor feeling a numbing sensation in his legs and was unable to move or pick himself up off the ground. Currently pt states he has sensation in his legs now but feels a stiffness. Denies any recent URI sxs, fevers, abd pain, N/V/D, HA, CP, SOB dizziness, and LOC. Did have an episode of darker colored urine last night but otherwise is not having any urinary sxs. Denies drug use today. Typically injects in AC or hands.

## 2019-03-01 NOTE — H&P ADULT - NSHPPHYSICALEXAM_GEN_ALL_CORE
Vital Signs Last 24 Hrs  T(C): 36.9 (01 Mar 2019 09:33), Max: 36.9 (01 Mar 2019 09:33)  T(F): 98.4 (01 Mar 2019 09:33), Max: 98.4 (01 Mar 2019 09:33)  HR: 101 (01 Mar 2019 12:45) (101 - 105)  BP: 155/72 (01 Mar 2019 12:45) (155/72 - 160/91)  BP(mean): --  RR: 16 (01 Mar 2019 12:45) (15 - 16)  SpO2: 98% (01 Mar 2019 12:45) (96% - 100%)    Constitutional: NAD.   HEENT: PERRL, EOMI, MMM.  Neck: Soft and supple, No carotid bruit, No JVD  Respiratory: Breath sounds are clear bilaterally, No wheezing, rales or rhonchi  Cardiovascular: S1 and S2, regular rate and rhythm, no murmur, rub or gallop.  Gastrointestinal: Bowel Sounds present, soft, nontender, nondistended, no guarding, no rebound, no mass.  Extremities: No peripheral edema  Vascular: 2+ peripheral pulses  Neurological: A/O x , no focal deficits  Musculoskeletal: DP/PT PULSES INTACT.  COMPARTMENTS ARE SOFT.  (+) FOOT AND CALF TENDERNESS.  (+) L FOOT NUMBNESS AND TINGLING.  Skin:  no visible rashes.

## 2019-03-01 NOTE — CONSULT NOTE ADULT - ASSESSMENT
28M  presenting to ED after heroin overdose  w prolonged immobilziation in  his room last night after passing out sitting on the floor.  woke up w/ numbness and tingling in his legs and unable to ambulate.       Rhabdomyolysis after Heroin overdose   + hematuria and neg blood on UA c/w myoglobinuria - higher risk for heme pigment renal  failure    and risk for continued rise in CPK after heroin overdose   will change IVF to bicarbonate based to alkalanize urine   monitor CPK , Mg, Phos ,Ca and K   daily labs     d/w mother at bedside  d.w Dr D'Amico Dr Yun to cover 3/2 - 3/3    Thank you for the courtesy of this consult. We will follow this patient with you.   Management is subject to change if new information becomes available or patient condition changes.

## 2019-03-01 NOTE — ED ADULT NURSE NOTE - OBJECTIVE STATEMENT
Pt c/o b/l foot numbness and pain s/p shooting up heroin last night. Pt states he was "wigging out" at home and was stuck in a "pretzel" position all evening and now has b/l foot pain and numbness. Pt able to lift both legs up, +pulses bilaterally. Pt states he was sober for a few days and relapsed last evening. No respiratory compromise noted, pt A+Ox3.

## 2019-03-02 LAB
ANION GAP SERPL CALC-SCNC: 9 MMOL/L — SIGNIFICANT CHANGE UP (ref 5–17)
BASOPHILS # BLD AUTO: 0.02 K/UL — SIGNIFICANT CHANGE UP (ref 0–0.2)
BASOPHILS NFR BLD AUTO: 0.2 % — SIGNIFICANT CHANGE UP (ref 0–2)
BUN SERPL-MCNC: 7 MG/DL — SIGNIFICANT CHANGE UP (ref 7–23)
CALCIUM SERPL-MCNC: 7.9 MG/DL — LOW (ref 8.5–10.1)
CHLORIDE SERPL-SCNC: 107 MMOL/L — SIGNIFICANT CHANGE UP (ref 96–108)
CK SERPL-CCNC: CRITICAL HIGH U/L (ref 26–308)
CO2 SERPL-SCNC: 24 MMOL/L — SIGNIFICANT CHANGE UP (ref 22–31)
CREAT SERPL-MCNC: 0.64 MG/DL — SIGNIFICANT CHANGE UP (ref 0.5–1.3)
EOSINOPHIL # BLD AUTO: 0.08 K/UL — SIGNIFICANT CHANGE UP (ref 0–0.5)
EOSINOPHIL NFR BLD AUTO: 0.9 % — SIGNIFICANT CHANGE UP (ref 0–6)
GLUCOSE SERPL-MCNC: 142 MG/DL — HIGH (ref 70–99)
HCT VFR BLD CALC: 41 % — SIGNIFICANT CHANGE UP (ref 39–50)
HGB BLD-MCNC: 14.4 G/DL — SIGNIFICANT CHANGE UP (ref 13–17)
IMM GRANULOCYTES NFR BLD AUTO: 0.2 % — SIGNIFICANT CHANGE UP (ref 0–1.5)
LYMPHOCYTES # BLD AUTO: 2.56 K/UL — SIGNIFICANT CHANGE UP (ref 1–3.3)
LYMPHOCYTES # BLD AUTO: 29.3 % — SIGNIFICANT CHANGE UP (ref 13–44)
MAGNESIUM SERPL-MCNC: 2.1 MG/DL — SIGNIFICANT CHANGE UP (ref 1.6–2.6)
MCHC RBC-ENTMCNC: 30.9 PG — SIGNIFICANT CHANGE UP (ref 27–34)
MCHC RBC-ENTMCNC: 35.1 GM/DL — SIGNIFICANT CHANGE UP (ref 32–36)
MCV RBC AUTO: 88 FL — SIGNIFICANT CHANGE UP (ref 80–100)
MONOCYTES # BLD AUTO: 1.08 K/UL — HIGH (ref 0–0.9)
MONOCYTES NFR BLD AUTO: 12.3 % — SIGNIFICANT CHANGE UP (ref 2–14)
NEUTROPHILS # BLD AUTO: 4.99 K/UL — SIGNIFICANT CHANGE UP (ref 1.8–7.4)
NEUTROPHILS NFR BLD AUTO: 57.1 % — SIGNIFICANT CHANGE UP (ref 43–77)
NRBC # BLD: 0 /100 WBCS — SIGNIFICANT CHANGE UP (ref 0–0)
PHOSPHATE SERPL-MCNC: 3 MG/DL — SIGNIFICANT CHANGE UP (ref 2.5–4.5)
PLATELET # BLD AUTO: 166 K/UL — SIGNIFICANT CHANGE UP (ref 150–400)
POTASSIUM SERPL-MCNC: 3.9 MMOL/L — SIGNIFICANT CHANGE UP (ref 3.5–5.3)
POTASSIUM SERPL-SCNC: 3.9 MMOL/L — SIGNIFICANT CHANGE UP (ref 3.5–5.3)
RBC # BLD: 4.66 M/UL — SIGNIFICANT CHANGE UP (ref 4.2–5.8)
RBC # FLD: 12.2 % — SIGNIFICANT CHANGE UP (ref 10.3–14.5)
SODIUM SERPL-SCNC: 140 MMOL/L — SIGNIFICANT CHANGE UP (ref 135–145)
WBC # BLD: 8.75 K/UL — SIGNIFICANT CHANGE UP (ref 3.8–10.5)
WBC # FLD AUTO: 8.75 K/UL — SIGNIFICANT CHANGE UP (ref 3.8–10.5)

## 2019-03-02 PROCEDURE — 99223 1ST HOSP IP/OBS HIGH 75: CPT

## 2019-03-02 PROCEDURE — 99232 SBSQ HOSP IP/OBS MODERATE 35: CPT

## 2019-03-02 RX ORDER — METHADONE HYDROCHLORIDE 40 MG/1
5 TABLET ORAL EVERY 4 HOURS
Qty: 0 | Refills: 0 | Status: DISCONTINUED | OUTPATIENT
Start: 2019-03-03 | End: 2019-03-05

## 2019-03-02 RX ORDER — TRAMADOL HYDROCHLORIDE 50 MG/1
50 TABLET ORAL ONCE
Qty: 0 | Refills: 0 | Status: DISCONTINUED | OUTPATIENT
Start: 2019-03-02 | End: 2019-03-02

## 2019-03-02 RX ORDER — LANOLIN ALCOHOL/MO/W.PET/CERES
5 CREAM (GRAM) TOPICAL AT BEDTIME
Qty: 0 | Refills: 0 | Status: DISCONTINUED | OUTPATIENT
Start: 2019-03-02 | End: 2019-03-08

## 2019-03-02 RX ORDER — METHADONE HYDROCHLORIDE 40 MG/1
5 TABLET ORAL EVERY 4 HOURS
Qty: 0 | Refills: 0 | Status: DISCONTINUED | OUTPATIENT
Start: 2019-03-04 | End: 2019-03-06

## 2019-03-02 RX ORDER — NALOXONE HYDROCHLORIDE 4 MG/.1ML
0.4 SPRAY NASAL ONCE
Qty: 0 | Refills: 0 | Status: DISCONTINUED | OUTPATIENT
Start: 2019-03-02 | End: 2019-03-02

## 2019-03-02 RX ORDER — NALOXONE HYDROCHLORIDE 4 MG/.1ML
0.4 SPRAY NASAL ONCE
Qty: 0 | Refills: 0 | Status: DISCONTINUED | OUTPATIENT
Start: 2019-03-02 | End: 2019-03-08

## 2019-03-02 RX ORDER — METHADONE HYDROCHLORIDE 40 MG/1
5 TABLET ORAL DAILY
Qty: 0 | Refills: 0 | Status: DISCONTINUED | OUTPATIENT
Start: 2019-03-05 | End: 2019-03-05

## 2019-03-02 RX ORDER — METHADONE HYDROCHLORIDE 40 MG/1
10 TABLET ORAL DAILY
Qty: 0 | Refills: 0 | Status: DISCONTINUED | OUTPATIENT
Start: 2019-03-03 | End: 2019-03-03

## 2019-03-02 RX ORDER — METHADONE HYDROCHLORIDE 40 MG/1
5 TABLET ORAL DAILY
Qty: 0 | Refills: 0 | Status: DISCONTINUED | OUTPATIENT
Start: 2019-03-06 | End: 2019-03-08

## 2019-03-02 RX ORDER — METHADONE HYDROCHLORIDE 40 MG/1
10 TABLET ORAL DAILY
Qty: 0 | Refills: 0 | Status: DISCONTINUED | OUTPATIENT
Start: 2019-03-02 | End: 2019-03-02

## 2019-03-02 RX ORDER — METHADONE HYDROCHLORIDE 40 MG/1
5 TABLET ORAL EVERY 4 HOURS
Qty: 0 | Refills: 0 | Status: DISCONTINUED | OUTPATIENT
Start: 2019-03-02 | End: 2019-03-03

## 2019-03-02 RX ORDER — METHADONE HYDROCHLORIDE 40 MG/1
5 TABLET ORAL DAILY
Qty: 0 | Refills: 0 | Status: DISCONTINUED | OUTPATIENT
Start: 2019-03-04 | End: 2019-03-04

## 2019-03-02 RX ADMIN — Medication 650 MILLIGRAM(S): at 00:34

## 2019-03-02 RX ADMIN — Medication 650 MILLIGRAM(S): at 18:12

## 2019-03-02 RX ADMIN — Medication 5 MILLIGRAM(S): at 00:34

## 2019-03-02 RX ADMIN — METHADONE HYDROCHLORIDE 10 MILLIGRAM(S): 40 TABLET ORAL at 11:12

## 2019-03-02 RX ADMIN — TRAMADOL HYDROCHLORIDE 50 MILLIGRAM(S): 50 TABLET ORAL at 03:05

## 2019-03-02 RX ADMIN — Medication 5 MILLIGRAM(S): at 22:00

## 2019-03-02 RX ADMIN — ENOXAPARIN SODIUM 40 MILLIGRAM(S): 100 INJECTION SUBCUTANEOUS at 18:11

## 2019-03-02 RX ADMIN — Medication 1 PATCH: at 07:43

## 2019-03-02 RX ADMIN — Medication 1 PATCH: at 11:13

## 2019-03-02 RX ADMIN — Medication 1 MILLIGRAM(S): at 05:50

## 2019-03-02 RX ADMIN — AMLODIPINE BESYLATE 5 MILLIGRAM(S): 2.5 TABLET ORAL at 22:00

## 2019-03-02 NOTE — CONSULT NOTE ADULT - SUBJECTIVE AND OBJECTIVE BOX
Patient is a 28y old  Male who presents with a chief complaint of Patient is a 28y old  Male who presents with a chief complaint of heroin overdose and lower extremity numbness and weakness.      HPI:  He reports that very early AM of 3/1 (about 1 AM) he injected heroin. He was seated cross legged on the floor and passed out. He woke up about six hours later with numbness/tingling of his lower extremities and inability to bear weight.  He denies any injections into the lower extremities.  He now says that he is able to move his right foot more than he could yesterday. Tingling persists throughout his entire lower extremities.  He reports having pain throughout his legs. He denies back pain. He does not have any mervat urinary or bowel incontinence but does report difficulty controlling urine due to large quantities of IV hydration.   In the ED his CPK was 33,000. He is being treated for rhabdomyloysis. He was seen by surgery. No signs of compartment syndrome at this time.         ROS:  (+) dark urine, L foot numbness, unable to bear weight.  (-) SI.    PMHx:  anxiety;  polysubstance abuse - heroine, alcohol.     PSHx:  none    ALL:  NKDA.    Rx:  denies.    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  (+) IVDA.  prior attempts at rehab.  (+) tobacco.  (+) EtOH.    FHx:  (+) father AMI 40s. (01 Mar 2019 15:27)        MEDICATIONS  (STANDING):  amLODIPine   Tablet 5 milliGRAM(s) Oral at bedtime  clonazePAM Tablet 1 milliGRAM(s) Oral two times a day  enoxaparin Injectable 40 milliGRAM(s) SubCutaneous every 24 hours  nicotine -  14 mG/24Hr(s) Patch 1 patch Transdermal daily  sodium bicarbonate  Infusion 0.134 mEq/kG/Hr (150 mL/Hr) IV Continuous <Continuous>       Allergies    No Known Allergies    Intolerances        ROS: Pertinent positives in HPI, all other ROS were reviewed and are negative.      Vital Signs Last 24 Hrs  T(C): 37.1 (02 Mar 2019 05:13), Max: 37.9 (01 Mar 2019 23:23)  T(F): 98.8 (02 Mar 2019 05:13), Max: 100.3 (01 Mar 2019 23:23)  HR: 100 (02 Mar 2019 05:13) (84 - 107)  BP: 135/76 (02 Mar 2019 05:13) (135/76 - 167/90)  BP(mean): --  RR: 17 (02 Mar 2019 05:13) (16 - 17)  SpO2: 98% (02 Mar 2019 05:13) (97% - 100%)        Constitutional: awake and alert.  HEENT: PERRLA, EOMI,   Neck: Supple.  Respiratory: Breath sounds are clear bilaterally  Cardiovascular: S1 and S2, regular / irregular rhythm  Gastrointestinal: soft, nontender  Extremities:  no edema, no erythema. Legs are warm to touch.   Vascular: pedal pulses 2+  Musculoskeletal: no joint swelling/tenderness, no abnormal movements  spine: no point tenderness with palpation of spine  Skin: No rashes    Neurological exam:  HF: A x O x 3. Appropriately interactive, normal affect. Speech fluent, No Aphasia or paraphasic errors. Naming /repetition intact   CN: GABO, EOMI, VFF, facial sensation normal, no NLFD, tongue midline, Palate moves equally, SCM equal bilaterally  Motor: No pronator drift, normal tone.  5/5 in bilateral upper extremities. 5/5 in hip flexion bilaterally, 5-/5 in right knee flexion, extension, 4+/5 in right plantar flexion, 3/5 in right dorsiflexion.  4/5 in left knee flexion/extension, 1-2/5 in left dorsiflexion, 2/5 in left plantar flexion.   Sens: decreased light touch on left leg, foot, compared to right. No sensory level, joint position sense intact  Reflexes: 1/4 in triceps b/l, trace biceps and radialis, 1/4 in bilateral patellars, absent achilles reflexes bilaterally. plantars flexor bilaterally  Coord:  No FNFA, dysmetria, SUDHA intact   Gait/Balance: not tested        Labs:   03-02    140  |  107  |  7   ----------------------------<  142<H>  3.9   |  24  |  0.64    Ca    7.9<L>      02 Mar 2019 07:12  Phos  3.0     03-02  Mg     2.1     03-02                                14.4   8.75  )-----------( 166      ( 02 Mar 2019 07:12 )             41.0       Radiology:  lower extremity venous dopplers 3/1/19:  No evidence of bilateral lower extremity deep venous thrombosis.

## 2019-03-02 NOTE — PROGRESS NOTE ADULT - SUBJECTIVE AND OBJECTIVE BOX
NEPHROLOGY INTERVAL HPI/OVERNIGHT EVENTS:    Date of Service: 19 @ 14:44  3/ SY,  Covering for Dr. Fuentes  No acute events.  Very concerned due to LE weakness.    28M.  presenteing to ED after heroin overdose.  In his room last night and passed out sitting on the floor.  He woke up in the morning w/ numbness and tingling in his legs.  Was unable to ambulate. Continues to c/o b/l foot numbness and pain.   n ED, CPK 33K.  given 2L of isotonic saline.  Renal eval for rhabdomyloysis    today - pt seen earlier today and case d.w Dr D'Amico   lethargic arousable  mother at bedside  pt c/o (+) dark urine, L foot numbness, unable to move feet     MEDICATIONS  (STANDING):  amLODIPine   Tablet 5 milliGRAM(s) Oral at bedtime  clonazePAM Tablet 1 milliGRAM(s) Oral two times a day  enoxaparin Injectable 40 milliGRAM(s) SubCutaneous every 24 hours  nicotine -  14 mG/24Hr(s) Patch 1 patch Transdermal daily  sodium bicarbonate  Infusion 0.134 mEq/kG/Hr (150 mL/Hr) IV Continuous <Continuous>    MEDICATIONS  (PRN):  acetaminophen   Tablet .. 650 milliGRAM(s) Oral every 6 hours PRN Mild Pain (1 - 3)  loperamide 2 milliGRAM(s) Oral every 4 hours PRN After each loose stool as needed for diarrhea  methadone    Tablet 5 milliGRAM(s) Oral every 4 hours PRN per guideline  prochlorperazine   Tablet 10 milliGRAM(s) Oral every 8 hours PRN Nausea and/or Vomiting      Vital Signs Last 24 Hrs  T(C): 37.1 (02 Mar 2019 05:13), Max: 37.9 (01 Mar 2019 23:23)  T(F): 98.8 (02 Mar 2019 05:13), Max: 100.3 (01 Mar 2019 23:23)  HR: 100 (02 Mar 2019 05:13) (84 - 107)  BP: 135/76 (02 Mar 2019 05:13) (135/76 - 167/90)  BP(mean): --  RR: 17 (02 Mar 2019 05:13) (16 - 17)  SpO2: 98% (02 Mar 2019 05:13) (97% - 100%)  Daily     Daily      @ 07:01  -   @ 14:44  --------------------------------------------------------  IN: 0 mL / OUT: 1350 mL / NET: -1350 mL      PHYSICAL EXAM:  Alert and appropriate  GENERAL: No distress  CHEST/LUNG: Clear to aus  HEART: S1S2 RRR  ABDOMEN: soft  EXTREMITIES: no edema  SKIN:     LABS:                        14.4   8.75  )-----------( 166      ( 02 Mar 2019 07:12 )             41.0     -    140  |  107  |  7   ----------------------------<  142<H>  3.9   |  24  |  0.64    Ca    7.9<L>      02 Mar 2019 07:12  Phos  3.0       Mg     2.1     02        Urinalysis Basic - ( 01 Mar 2019 15:45 )    Color: Zara / Appearance: Clear / S.015 / pH: x  Gluc: x / Ketone: Moderate  / Bili: Negative / Urobili: 1 mg/dL   Blood: x / Protein: 100 mg/dL / Nitrite: Positive   Leuk Esterase: Trace / RBC: Negative /HPF / WBC 0-2   Sq Epi: x / Non Sq Epi: Occasional / Bacteria: Occasional      Phosphorus Level, Serum: 3.0 mg/dL ( @ 07:12)  Magnesium, Serum: 2.1 mg/dL ( @ 07:12)          RADIOLOGY & ADDITIONAL TESTS:

## 2019-03-02 NOTE — PROGRESS NOTE ADULT - ASSESSMENT
Assessment/Plan:  28M heroin overdose with Bilateral lower extremity swelling/numbness/tingling.  Rhabdomyolysis  - No clinical findings at this time concerning for compartment syndrome of lower extremities, will continue to follow and reassess  - Trend CPK, hydration with NS, Recc 1L NS bolus today x 2  - Calculate Strict I & O's  - Monitor K  - will follow    discussed with Dr. Blackburn

## 2019-03-02 NOTE — PROGRESS NOTE ADULT - ASSESSMENT
*Rhabdomyolysis after Heroin overdose   -Positive hematuria and neg blood on UA c/w myoglobinuria - higher risk for heme pigment renal  failure   and risk for continued rise in CPK after heroin overdose  -IVF with bicarbonate  -Monitor CPK,Mg, Phos ,Ca and K Daily  -Monitor Urine Output   -renal consult appreciated     *LE pain, numbness and tingling most likely 2ndry to rhabdo   -neuro consult     *IV Heroin Overdose   *Hx of Anxiety and Polysusbtance Abuse   -Start COWS protocol with Low dose 5 day methadone Taper  -SW + Case Management.    *DVT ppx Lovenox *Rhabdomyolysis after Heroin overdose   -Positive hematuria and neg blood on UA c/w myoglobinuria - higher risk for heme pigment renal  failure   and risk for continued rise in CPK after heroin overdose  -IVF with bicarbonate  -Monitor CPK,Mg, Phos ,Ca and K Daily  -Monitor Urine Output   -renal consult appreciated     *LE pain, B/L Foot Drop, numbness and tingling most likely 2ndry to rhabdo R/O transverse myelitis or spinal cord vasculitis.  -bilateral foot drop. likely has bilateral compression of the peroneal nerves due to the position in which he was seated when he lost consciousness  -neuro consult appreciated -  May need bilateral ankle foot orthoses.  -FU MRI if spine  -PT evaluation    *IV Heroin Overdose   *Hx of Anxiety and Polysusbtance Abuse   -Start COWS protocol with Low dose 5 day methadone Taper  -SW + Case Management.    *DVT ppx Lovenox

## 2019-03-02 NOTE — CONSULT NOTE ADULT - ASSESSMENT
28 year old man with polysubstance abuse who used heroine and passed out while seated cross legged and who presents with lower extremity paresthesias and weakness.  He does have bilateral foot drop.   He likely has bilateral compression of the peroneal nerves due to the position in which he was seated when he lost consciousness.   There are also reports of severe rhabdomyolysis mimicking transverse myelitis or spinal cord vasculitis.  Will image thoracic and lumbar spine to rule out more serious causes.  Continue supportive care with IV hydration for treatment of rhabdomyolysis.  Physical therapy consult. May need bilateral ankle foot orthoses.    Will follow.

## 2019-03-02 NOTE — PROGRESS NOTE ADULT - SUBJECTIVE AND OBJECTIVE BOX
Patient admits pain in lower extremities from hips down.  Moving bilateral lower extremities well, admits unable to ambulate.  No acute events overnight.     PE:  Vital Signs Last 24 Hrs  T(C): 37.1 (02 Mar 2019 05:13), Max: 37.9 (01 Mar 2019 23:23)  T(F): 98.8 (02 Mar 2019 05:13), Max: 100.3 (01 Mar 2019 23:23)  HR: 100 (02 Mar 2019 05:13) (84 - 107)  BP: 135/76 (02 Mar 2019 05:13) (135/76 - 167/90)  BP(mean): --  RR: 17 (02 Mar 2019 05:13) (16 - 17)  SpO2: 98% (02 Mar 2019 05:13) (97% - 100%)    Gen:  NAD, AOX3  HEENT:  EOMI  CV:  s1 s2  Pulm:  bilateral chest rise  Abd:  soft, nontender  Ext:  RLE:  No edema, nontender, sensation intact throughout, 2+ DP/PT pulses, <3 sec capillary refill, warm, pink, compartments soft,   LLE:  mild edema, nontender, denies sensation in lateral forefoot and at hallux.  2+ DP/PT pulses bilateral, compartments soft, <3 sec capillary refill, warm, pink, small abrasion at lateral malleoulus                          14.4   8.75  )-----------( 166      ( 02 Mar 2019 07:12 )             41.0   03-02    140  |  107  |  7   ----------------------------<  142<H>  3.9   |  24  |  0.64    Ca    7.9<L>      02 Mar 2019 07:12  Phos  3.0     03-02  Mg     2.1     03-02

## 2019-03-02 NOTE — PROGRESS NOTE ADULT - ASSESSMENT
28M  presenting to ED after heroin overdose  w prolonged immobilziation in  his room last night after passing out sitting on the floor.  woke up w/ numbness and tingling in his legs and unable to ambulate.       Rhabdomyolysis after Heroin overdose   + hematuria and neg blood on UA c/w myoglobinuria - higher risk for heme pigment renal  failure    and risk for continued rise in CPK after heroin overdose   will change IVF to bicarbonate based to alkalanize urine   monitor CPK , Mg, Phos ,Ca and K   daily labs     d/w mother at bedside  d.w Dr D'Amico Dr Yun to cover 3/2 - 3/3    Thank you for the courtesy of this consult. We will follow this patient with you.   Management is subject to change if new information becomes available or patient condition changes.    3/2 SY  --Rhabdomyolysis.   Renal function stable.  CPK slowly trending down.  Continue current IVF.  --Follow electrolytes closely.

## 2019-03-02 NOTE — PROVIDER CONTACT NOTE (CHANGE IN STATUS NOTIFICATION) - SITUATION
pt appears very lethargic, unable to keep his eyes open or head up for conversation, pupils pinpoint. pt's friend recently left. pt is suspected to be under the influence of drugs

## 2019-03-02 NOTE — PROVIDER CONTACT NOTE (CHANGE IN STATUS NOTIFICATION) - ACTION/TREATMENT ORDERED:
narcan ordered, patient educated in depth about drug use and treatment options. when drugs were found, patient became very alert and anxious, dr castillo suggested against givin narcan at this time.

## 2019-03-02 NOTE — PROGRESS NOTE ADULT - SUBJECTIVE AND OBJECTIVE BOX
HOSPITALIST PROGRESS NOTE:  SUBJECTIVE:  PCP: Dr. Humberto Vasquez.    Chief Complaint: Patient is a 28y old  Male who presents with a chief complaint of Patient is a 28y old  Male who presents with a chief complaint of heroin overdose. (01 Mar 2019 21:17)      HPI:  28M.  admitted 19.  presented to ED after heroin overdose.  was in his room last night and passed out sitting on the floor.  he woke up in the morning w/ numbness and tingling in his legs.  was unable to ambulate.  continues to c/o b/l foot numbness and pain.  heroin was injected into his R arm (he denies injecting in his legs or feet).  in ED, CPK 33K.  given 2L of isotonic saline.  ROS:  (+) dark urine, L foot numbness, unable to bear weight.  (-) SI.    3/2: Above reviewed     Allergies:  No Known Allergies    REVIEW OF SYSTEMS:  See HPI. All other review of systems is negative unless indicated above.     OBJECTIVE  Physical Exam:  Vital Signs:    Vital Signs Last 24 Hrs  T(C): 37.1 (02 Mar 2019 05:13), Max: 37.9 (01 Mar 2019 23:23)  T(F): 98.8 (02 Mar 2019 05:13), Max: 100.3 (01 Mar 2019 23:23)  HR: 100 (02 Mar 2019 05:13) (84 - 107)  BP: 135/76 (02 Mar 2019 05:13) (135/76 - 167/90)  BP(mean): --  RR: 17 (02 Mar 2019 05:13) (15 - 17)  SpO2: 98% (02 Mar 2019 05:13) (96% - 100%)  I&O's Summary      Constitutional: NAD, awake and alert, well-developed  Neurological: AAO x 3, no focal deficits  HEENT: PERRLA, EOMI, MMM  Neck: Soft and supple, No LAD, No JVD  Respiratory: Breath sounds are clear bilaterally, No wheezing, rales or rhonchi  Cardiovascular: S1 and S2, regular rate and rhythm; no Murmurs, gallops or rubs  Gastrointestinal: Bowel Sounds present, soft, nontender, nondistended, no guarding, no rebound tenderness  Back: No CVA tenderness   Extremities: No peripheral edema  Vascular: 2+ peripheral pulses  Musculoskeletal: 5/5 strength b/l upper and lower extremities  Skin: No rashes  Breast: Deferred  Rectal: Deferred    MEDICATIONS  (STANDING):  amLODIPine   Tablet 5 milliGRAM(s) Oral at bedtime  clonazePAM Tablet 1 milliGRAM(s) Oral two times a day  enoxaparin Injectable 40 milliGRAM(s) SubCutaneous every 24 hours  methadone    Tablet 10 milliGRAM(s) Oral daily  nicotine -  14 mG/24Hr(s) Patch 1 patch Transdermal daily  sodium bicarbonate  Infusion 0.134 mEq/kG/Hr (150 mL/Hr) IV Continuous <Continuous>      LABS: All Labs Reviewed:                        14.4   8.75  )-----------( 166      ( 02 Mar 2019 07:12 )             41.0     03-02    140  |  107  |  7   ----------------------------<  142<H>  3.9   |  24  |  0.64    Ca    7.9<L>      02 Mar 2019 07:12  Phos  3.0     03-02  Mg     2.1     03-02        CARDIAC MARKERS ( 02 Mar 2019 07:12 )  x     / x     / 55347 U/L / x     / x      CARDIAC MARKERS ( 01 Mar 2019 20:23 )  x     / x     / 43262 U/L / x     / x      CARDIAC MARKERS ( 01 Mar 2019 15:43 )  x     / x     / 05384 U/L / x     / x      CARDIAC MARKERS ( 01 Mar 2019 09:48 )  x     / x     / 67542 U/L / x     / x            Urinalysis Basic - ( 01 Mar 2019 15:45 )    Color: Zara / Appearance: Clear / S.015 / pH: x  Gluc: x / Ketone: Moderate  / Bili: Negative / Urobili: 1 mg/dL   Blood: x / Protein: 100 mg/dL / Nitrite: Positive   Leuk Esterase: Trace / RBC: Negative /HPF / WBC 0-2   Sq Epi: x / Non Sq Epi: Occasional / Bacteria: Occasional        Blood Culture:       RADIOLOGY/EKG: HOSPITALIST PROGRESS NOTE:  SUBJECTIVE:  PCP: Dr. Humberto Vasquez.    Chief Complaint: Patient is a 28y old  Male who presents with a chief complaint of Patient is a 28y old  Male who presents with a chief complaint of heroin overdose. (01 Mar 2019 21:17)    HPI:  28M.  admitted 19.  presented to ED after heroin overdose.  was in his room last night and passed out sitting on the floor.  he woke up in the morning w/ numbness and tingling in his legs.  was unable to ambulate.  continues to c/o b/l foot numbness and pain.  heroin was injected into his R arm (he denies injecting in his legs or feet).  in ED, CPK 33K.  given 2L of isotonic saline.  ROS:  (+) dark urine, L foot numbness, unable to bear weight.  (-) SI.    3/2: Above reviewed; Patient complaining of a lot of pain in his legs; requesting pain meds;     Allergies:  No Known Allergies    REVIEW OF SYSTEMS:  See HPI. All other review of systems is negative unless indicated above.     OBJECTIVE  Physical Exam:  Vital Signs Last 24 Hrs  T(C): 37.1 (02 Mar 2019 05:13), Max: 37.9 (01 Mar 2019 23:23)  T(F): 98.8 (02 Mar 2019 05:13), Max: 100.3 (01 Mar 2019 23:23)  HR: 100 (02 Mar 2019 05:13) (84 - 107)  BP: 135/76 (02 Mar 2019 05:13) (135/76 - 167/90)  BP(mean): --  RR: 17 (02 Mar 2019 05:13) (16 - 17)  SpO2: 98% (02 Mar 2019 05:13) (97% - 100%)    Constitutional: NAD, awake and alert, well-developed  Neurological: AAO x 3, no focal deficits  HEENT: PERRLA, EOMI, MMM  Neck: Soft and supple, No LAD, No JVD  Respiratory: Breath sounds are clear bilaterally, No wheezing, rales or rhonchi  Cardiovascular: S1 and S2, regular rate and rhythm; no Murmurs, gallops or rubs  Gastrointestinal: Bowel Sounds present, soft, nontender, nondistended, no guarding, no rebound tenderness  Back: No CVA tenderness   Extremities: No peripheral edema  Vascular: 2+ peripheral pulses  Musculoskeletal: decrease strength in le; Unable to lift fully due to pain  Skin: No rashes  Breast: Deferred  Rectal: Deferred    MEDICATIONS  (STANDING):  amLODIPine   Tablet 5 milliGRAM(s) Oral at bedtime  clonazePAM Tablet 1 milliGRAM(s) Oral two times a day  enoxaparin Injectable 40 milliGRAM(s) SubCutaneous every 24 hours  methadone    Tablet 10 milliGRAM(s) Oral daily  nicotine -  14 mG/24Hr(s) Patch 1 patch Transdermal daily  sodium bicarbonate  Infusion 0.134 mEq/kG/Hr (150 mL/Hr) IV Continuous <Continuous>      LABS: All Labs Reviewed:                        14.4   8.75  )-----------( 166      ( 02 Mar 2019 07:12 )             41.0     03-02    140  |  107  |  7   ----------------------------<  142<H>  3.9   |  24  |  0.64    Ca    7.9<L>      02 Mar 2019 07:12  Phos  3.0     03-02  Mg     2.1     03-02        CARDIAC MARKERS ( 02 Mar 2019 07:12 )  x     / x     / 86039 U/L / x     / x      CARDIAC MARKERS ( 01 Mar 2019 20:23 )  x     / x     / 34360 U/L / x     / x      CARDIAC MARKERS ( 01 Mar 2019 15:43 )  x     / x     / 22520 U/L / x     / x      CARDIAC MARKERS ( 01 Mar 2019 09:48 )  x     / x     / 18640 U/L / x     / x            Urinalysis Basic - ( 01 Mar 2019 15:45 )    Color: Zara / Appearance: Clear / S.015 / pH: x  Gluc: x / Ketone: Moderate  / Bili: Negative / Urobili: 1 mg/dL   Blood: x / Protein: 100 mg/dL / Nitrite: Positive   Leuk Esterase: Trace / RBC: Negative /HPF / WBC 0-2   Sq Epi: x / Non Sq Epi: Occasional / Bacteria: Occasional    RADIOLOGY/EKG:    < from: Xray Chest 1 View- PORTABLE-Urgent (19 @ 13:33) >    FINDINGS/  IMPRESSION:          The lungs are clear.  No pleural abnormality is seen.      Cardiomediastinal silhouette is intact.      < end of copied text >

## 2019-03-02 NOTE — PROVIDER CONTACT NOTE (CHANGE IN STATUS NOTIFICATION) - RECOMMENDATIONS
supervision called and dr castillo notified. had supervision witness a search of patients belongings in which heroin and needle was found in wallet which was hidden under patient's food tray

## 2019-03-03 LAB
ANION GAP SERPL CALC-SCNC: 8 MMOL/L — SIGNIFICANT CHANGE UP (ref 5–17)
BUN SERPL-MCNC: 6 MG/DL — LOW (ref 7–23)
CALCIUM SERPL-MCNC: 8.1 MG/DL — LOW (ref 8.5–10.1)
CHLORIDE SERPL-SCNC: 107 MMOL/L — SIGNIFICANT CHANGE UP (ref 96–108)
CK SERPL-CCNC: CRITICAL HIGH U/L (ref 26–308)
CO2 SERPL-SCNC: 25 MMOL/L — SIGNIFICANT CHANGE UP (ref 22–31)
CREAT SERPL-MCNC: 0.47 MG/DL — LOW (ref 0.5–1.3)
GLUCOSE SERPL-MCNC: 116 MG/DL — HIGH (ref 70–99)
MAGNESIUM SERPL-MCNC: 2.2 MG/DL — SIGNIFICANT CHANGE UP (ref 1.6–2.6)
PHOSPHATE SERPL-MCNC: 3.8 MG/DL — SIGNIFICANT CHANGE UP (ref 2.5–4.5)
POTASSIUM SERPL-MCNC: 3.9 MMOL/L — SIGNIFICANT CHANGE UP (ref 3.5–5.3)
POTASSIUM SERPL-SCNC: 3.9 MMOL/L — SIGNIFICANT CHANGE UP (ref 3.5–5.3)
SODIUM SERPL-SCNC: 140 MMOL/L — SIGNIFICANT CHANGE UP (ref 135–145)

## 2019-03-03 PROCEDURE — 99232 SBSQ HOSP IP/OBS MODERATE 35: CPT

## 2019-03-03 RX ADMIN — Medication 1 MILLIGRAM(S): at 13:03

## 2019-03-03 RX ADMIN — Medication 1 PATCH: at 20:17

## 2019-03-03 RX ADMIN — Medication 1 PATCH: at 06:48

## 2019-03-03 RX ADMIN — ENOXAPARIN SODIUM 40 MILLIGRAM(S): 100 INJECTION SUBCUTANEOUS at 17:42

## 2019-03-03 RX ADMIN — Medication 650 MILLIGRAM(S): at 19:34

## 2019-03-03 RX ADMIN — Medication 650 MILLIGRAM(S): at 20:18

## 2019-03-03 RX ADMIN — AMLODIPINE BESYLATE 5 MILLIGRAM(S): 2.5 TABLET ORAL at 22:06

## 2019-03-03 RX ADMIN — METHADONE HYDROCHLORIDE 10 MILLIGRAM(S): 40 TABLET ORAL at 11:48

## 2019-03-03 RX ADMIN — Medication 1 MILLIGRAM(S): at 00:02

## 2019-03-03 RX ADMIN — Medication 1 MILLIGRAM(S): at 22:06

## 2019-03-03 RX ADMIN — Medication 1 PATCH: at 11:49

## 2019-03-03 RX ADMIN — Medication 150 MEQ/KG/HR: at 17:43

## 2019-03-03 RX ADMIN — Medication 650 MILLIGRAM(S): at 02:00

## 2019-03-03 RX ADMIN — Medication 5 MILLIGRAM(S): at 22:08

## 2019-03-03 NOTE — PROGRESS NOTE ADULT - SUBJECTIVE AND OBJECTIVE BOX
CC:Patient is a 28y old  Male who presents with a chief complaint of Patient is a 28y old  Male who presents with a chief complaint of heroin overdose. (03 Mar 2019 12:03)      Subjective:  Pt seen and examined at bedside with chaperone. Pt is AAOx3, pt in no acute distress. Pt states improved motor and sensory function of right > left foot/lower leg. Pt denied c/o fever, chills, chest pain, SOB, abd pain, N/V/D, hemoptysis, hematemesis, hematuria, hematochexia, headache, diplopia, vertigo, dizzyness. Pt tolerating diet, (+) void, (+) bowel function    Pt s/p heroine use/overdose with prolonged immobility resulting in rhabdomyolysis     ROS:  foot pain/dysfunction otherwise as abovementioned    Vital Signs Last 24 Hrs  T(C): 36.8 (03 Mar 2019 10:37), Max: 37 (02 Mar 2019 15:40)  T(F): 98.2 (03 Mar 2019 10:37), Max: 98.6 (02 Mar 2019 15:40)  HR: 76 (03 Mar 2019 10:37) (76 - 106)  BP: 104/50 (03 Mar 2019 10:37) (104/50 - 124/67)  BP(mean): --  RR: 16 (03 Mar 2019 10:37) (16 - 16)  SpO2: 98% (03 Mar 2019 10:37) (96% - 98%)    Labs:      CARDIAC MARKERS ( 03 Mar 2019 07:31 )  x     / x     / 52183 U/L / x     / x      CARDIAC MARKERS ( 02 Mar 2019 07:12 )  x     / x     / 41219 U/L / x     / x      CARDIAC MARKERS ( 01 Mar 2019 20:23 )  x     / x     / 17234 U/L / x     / x      CARDIAC MARKERS ( 01 Mar 2019 15:43 )  x     / x     / 65218 U/L / x     / x                                14.4   8.75  )-----------( 166      ( 02 Mar 2019 07:12 )             41.0     CBC Full  -  ( 02 Mar 2019 07:12 )  WBC Count : 8.75 K/uL  Hemoglobin : 14.4 g/dL  Hematocrit : 41.0 %  Platelet Count - Automated : 166 K/uL  Mean Cell Volume : 88.0 fl  Mean Cell Hemoglobin : 30.9 pg  Mean Cell Hemoglobin Concentration : 35.1 gm/dL  Auto Neutrophil # : 4.99 K/uL  Auto Lymphocyte # : 2.56 K/uL  Auto Monocyte # : 1.08 K/uL  Auto Eosinophil # : 0.08 K/uL  Auto Basophil # : 0.02 K/uL  Auto Neutrophil % : 57.1 %  Auto Lymphocyte % : 29.3 %  Auto Monocyte % : 12.3 %  Auto Eosinophil % : 0.9 %  Auto Basophil % : 0.2 %    03-03    140  |  107  |  6<L>  ----------------------------<  116<H>  3.9   |  25  |  0.47<L>    Ca    8.1<L>      03 Mar 2019 07:31  Phos  3.8     03-03  Mg     2.2     03-03              Meds:  acetaminophen   Tablet .. 650 milliGRAM(s) Oral every 6 hours PRN  amLODIPine   Tablet 5 milliGRAM(s) Oral at bedtime  clonazePAM Tablet 1 milliGRAM(s) Oral two times a day  enoxaparin Injectable 40 milliGRAM(s) SubCutaneous every 24 hours  loperamide 2 milliGRAM(s) Oral every 4 hours PRN  melatonin 5 milliGRAM(s) Oral at bedtime  methadone    Tablet 5 milliGRAM(s) Oral every 4 hours PRN  methadone    Tablet 5 milliGRAM(s) Oral every 4 hours PRN  naloxone Injectable 0.4 milliGRAM(s) IV Push once  nicotine -  14 mG/24Hr(s) Patch 1 patch Transdermal daily  prochlorperazine   Tablet 10 milliGRAM(s) Oral every 8 hours PRN  sodium bicarbonate  Infusion 0.134 mEq/kG/Hr IV Continuous <Continuous>      Radiology:  < from: US Duplex Venous Lower Ext Complete, Bilateral (03.01.19 @ 17:09) >  EXAM:  US DPLX LWR EXT VEINS COMPL BI                            PROCEDURE DATE:  03/01/2019          INTERPRETATION:  CLINICAL HISTORY: 28 years  Male with r/o DVT..   Rhabdomyolysis. Left lower extremity weakness. Stroke overdose.    COMPARISON: None available.    TECHNIQUE: Duplex sonography of the BILATERAL LOWER extremities with   color and spectral Doppler, with and without compression.      FINDINGS:    There is normal compressibility of the bilateral common femoral, femoral   and popliteal veins. No calf vein thrombosis is detected.    Doppler examination shows normal spontaneous and phasic flow.    IMPRESSION:     No evidence of bilateral lower extremity deep venous thrombosis.                    NEEL HARINI   This document has been electronically signed. Mar  1 2019  5:13PM    < end of copied text >  < from: Xray Chest 1 View- PORTABLE-Urgent (03.01.19 @ 13:33) >  EXAM:  XR CHEST PORTABLE URGENT 1V                            PROCEDURE DATE:  03/01/2019          INTERPRETATION:  Exam Date: 3/1/2019 1:33 PM    Chest radiograph (one view)         CLINICAL INFORMATION:  screen    TECHNIQUE:  Single frontal view of the chest was obtained.    COMPARISON: None    FINDINGS/  IMPRESSION:          The lungs are clear.  No pleural abnormality is seen.      Cardiomediastinal silhouette is intact.                  VERONIKA GUZMÁN M.D., ATTENDING RADIOLOGIST  This document has been electronically signed. Mar  1 2019  3:41PM    < end of copied text >      Physical exam:  GCS of 15  Pt is aaox3  Pt in no acute distress  Airway is patent  Breathing is symmetric and unlabored  Neuro: CN II-XII grossly intact  Psych: normal affect  HEENT: normocephalic, PAULA, EOM wnl, no gross craniofacial bony pathology to exam  Neck: No tracheal deviation, no JVD, no crepitus, no ecchymosis, no hematoma  Chest: No gross rib or sternal pathology or tenderness to exam, no crepitus, no ecchymosis, no hematoma  Resp: CTAB  CVS: S1S2(+)  ABD: bowel sounds (+), soft, nontender, non distended, no rebound, no guarding, no rigidity, no pelvic instability to exam  EXT: no gross calf tenderness or edema to exam b/l, pt has good capillary refill in all digits. (+) right foot and lower leg sensoromotor function that is improved since admission, pt states sensory to left foot and lower leg though unable to pronate/suppinate/rotate left foot. . (+) appropriate capilliary refill in all digitis, compartments are soft and nontender to exam  Skin: no adverse skin changes to exam

## 2019-03-03 NOTE — PROGRESS NOTE ADULT - ASSESSMENT
Assessment/Plan:  28M heroin overdose with Bilateral lower extremity swelling/numbness/tingling.  Rhabdomyolysis.  - No clinical findings at this time concerning for compartment syndrome of lower extremities, will continue to follow and reassess  - Trend CPK (Down to 13K from 33K), hydration with NS + Bicarb per nephro  - Neuro reccs:  MRI thoracic and Lumbar spine.  - Calculate Strict I & O's  - Monitor K  - will follow    discussed with Dr. Blackburn

## 2019-03-03 NOTE — PROGRESS NOTE ADULT - ASSESSMENT
28 year old man with polysubstance abuse who used heroine and passed out while seated cross legged and who presents with lower extremity paresthesias and weakness.  He does have bilateral foot drop.   He likely has bilateral compression of the peroneal nerves due to the position in which he was seated when he lost consciousness.   There are also reports of severe rhabdomyolysis mimicking transverse myelitis or spinal cord vasculitis.  He has had some improvement in strength in right lower extremity.   MRI thoracic and lumbar spine pending.   Continue supportive care with IV hydration for treatment of rhabdomyolysis. CK is greatly elevated but is trending down.   Physical therapy consult. May need bilateral ankle foot orthoses. May be candidate for acute rehab.    Will follow.

## 2019-03-03 NOTE — PROGRESS NOTE ADULT - SUBJECTIVE AND OBJECTIVE BOX
No acute events overnight, admits sensation better, unable to weight bear and ambulate as per patient.  Was able to move and bend both lower extremities yesterday without issue.  Compartments remain soft.    PE:  Vital Signs Last 24 Hrs  T(C): 37 (02 Mar 2019 15:40), Max: 37 (02 Mar 2019 15:40)  T(F): 98.6 (02 Mar 2019 15:40), Max: 98.6 (02 Mar 2019 15:40)  HR: 106 (02 Mar 2019 15:40) (106 - 106)  BP: 124/67 (02 Mar 2019 15:40) (124/67 - 124/67)  BP(mean): --  RR: 16 (02 Mar 2019 15:40) (16 - 16)  SpO2: 96% (02 Mar 2019 15:40) (96% - 96%)    Gen:  NAD, AOX3  HEENT:  EOMI  CV:  s1 s2  Pulm:  bilateral chest rise  Abd:  soft, nontender  Ext:  RLE:  No edema, nontender, sensation intact throughout, 2+ DP/PT pulses, <3 sec capillary refill, warm, pink, compartments soft,   LLE:  mild edema, nontender, denies sensation in lateral forefoot and at hallux.  2+ DP/PT pulses bilateral, compartments soft, <3 sec capillary refill, warm, pink, small abrasion at lateral malleoulus                                     14.4   8.75  )-----------( 166      ( 02 Mar 2019 07:12 )             41.0   03-03    140  |  107  |  6<L>  ----------------------------<  116<H>  3.9   |  25  |  0.47<L>    Ca    8.1<L>      03 Mar 2019 07:31  Phos  3.8     03-03  Mg     2.2     03-03

## 2019-03-03 NOTE — PROGRESS NOTE ADULT - ASSESSMENT
*Rhabdomyolysis after Heroin overdose   -Positive hematuria and neg blood on UA c/w myoglobinuria - higher risk for heme pigment renal  failure   and risk for continued rise in CPK after heroin overdose  -IVF with bicarbonate  -Monitor CPK,Mg, Phos ,Ca and K Daily  -Monitor Urine Output   -renal consult appreciated     *LE pain, B/L Foot Drop, numbness and tingling most likely 2ndry to rhabdo R/O transverse myelitis or spinal cord vasculitis.  -bilateral foot drop. likely has bilateral compression of the peroneal nerves due to the position in which he was seated when he lost consciousness  -neuro consult appreciated -  May need bilateral ankle foot orthoses.   -FU MRI if spine  -PT evaluation    *IV Heroin Overdose   *Hx of Anxiety and Polysusbtance Abuse   -Start COWS protocol with Low dose 5 day methadone Taper  -SW + Case Management.    *DVT ppx Lovenox

## 2019-03-03 NOTE — PROGRESS NOTE ADULT - ASSESSMENT
A/P:  Rhabdomyolysis   Distal lower ext dysfunction  Medical management per primary service  No evidence for compartment syndrome  F/U labs, trend CPK  No acute surgical intervention for pt at this time

## 2019-03-03 NOTE — PROGRESS NOTE ADULT - SUBJECTIVE AND OBJECTIVE BOX
HOSPITALIST PROGRESS NOTE:  SUBJECTIVE:  PCP: Dr. Humberto Vasquez.    Chief Complaint: Patient is a 28y old  Male who presents with a chief complaint of Patient is a 28y old  Male who presents with a chief complaint of heroin overdose. (01 Mar 2019 21:17)    HPI:  28M.  admitted 19.  presented to ED after heroin overdose.  was in his room last night and passed out sitting on the floor.  he woke up in the morning w/ numbness and tingling in his legs.  was unable to ambulate.  continues to c/o b/l foot numbness and pain.  heroin was injected into his R arm (he denies injecting in his legs or feet).  in ED, CPK 33K.  given 2L of isotonic saline.  ROS:  (+) dark urine, L foot numbness, unable to bear weight.  (-) SI.    3/2: Above reviewed; Patient complaining of a lot of pain in his legs; requesting pain meds;   3/3: yesterday nurse found patient to have pin point pupils. he looked high; A friend was there and syringe was found on the floor; Security was called and items were confiscated; patient had to be placed on 1:1; family spoke with staff and requesting a psych evaluation for his severe anxiety    Allergies:  No Known Allergies    REVIEW OF SYSTEMS:  See HPI. All other review of systems is negative unless indicated above.     OBJECTIVE  Physical Exam:  Vital Signs Last 24 Hrs  T(C): 36.8 (03 Mar 2019 10:37), Max: 37 (02 Mar 2019 15:40)  T(F): 98.2 (03 Mar 2019 10:37), Max: 98.6 (02 Mar 2019 15:40)  HR: 76 (03 Mar 2019 10:37) (76 - 106)  BP: 104/50 (03 Mar 2019 10:37) (104/50 - 124/67)  BP(mean): --  RR: 16 (03 Mar 2019 10:37) (16 - 16)  SpO2: 98% (03 Mar 2019 10:37) (96% - 98%)    Constitutional: NAD, awake and alert, well-developed  Neurological: AAO x 3, no focal deficits  HEENT: PERRLA, EOMI, MMM  Neck: Soft and supple, No LAD, No JVD  Respiratory: Breath sounds are clear bilaterally, No wheezing, rales or rhonchi  Cardiovascular: S1 and S2, regular rate and rhythm; no Murmurs, gallops or rubs  Gastrointestinal: Bowel Sounds present, soft, nontender, nondistended, no guarding, no rebound tenderness  Back: No CVA tenderness   Extremities: No peripheral edema  Vascular: 2+ peripheral pulses  Musculoskeletal: decrease strength in B/L LE left >right; Unable to lift LLE fully due to pain  Skin: No rashes  Breast: Deferred  Rectal: Deferred    MEDICATIONS  (STANDING):  amLODIPine   Tablet 5 milliGRAM(s) Oral at bedtime  clonazePAM Tablet 1 milliGRAM(s) Oral two times a day  enoxaparin Injectable 40 milliGRAM(s) SubCutaneous every 24 hours  methadone    Tablet 10 milliGRAM(s) Oral daily  nicotine -  14 mG/24Hr(s) Patch 1 patch Transdermal daily  sodium bicarbonate  Infusion 0.134 mEq/kG/Hr (150 mL/Hr) IV Continuous <Continuous>    Lab Results:  CBC  CBC Full  -  ( 02 Mar 2019 07:12 )  WBC Count : 8.75 K/uL  Hemoglobin : 14.4 g/dL  Hematocrit : 41.0 %  Platelet Count - Automated : 166 K/uL  Mean Cell Volume : 88.0 fl  Mean Cell Hemoglobin : 30.9 pg  Mean Cell Hemoglobin Concentration : 35.1 gm/dL  Auto Neutrophil # : 4.99 K/uL  Auto Lymphocyte # : 2.56 K/uL  Auto Monocyte # : 1.08 K/uL  Auto Eosinophil # : 0.08 K/uL  Auto Basophil # : 0.02 K/uL  Auto Neutrophil % : 57.1 %  Auto Lymphocyte % : 29.3 %  Auto Monocyte % : 12.3 %  Auto Eosinophil % : 0.9 %  Auto Basophil % : 0.2 %    .		Differential:	[] Automated		[] Manual  Chemistry                        14.4   8.75  )-----------( 166      ( 02 Mar 2019 07:12 )             41.0     03-03    140  |  107  |  6<L>  ----------------------------<  116<H>  3.9   |  25  |  0.47<L>    Ca    8.1<L>      03 Mar 2019 07:31  Phos  3.8     03-03  Mg     2.2     03-03    Urinalysis Basic - ( 01 Mar 2019 15:45 )    Color: Zara / Appearance: Clear / S.015 / pH: x  Gluc: x / Ketone: Moderate  / Bili: Negative / Urobili: 1 mg/dL   Blood: x / Protein: 100 mg/dL / Nitrite: Positive   Leuk Esterase: Trace / RBC: Negative /HPF / WBC 0-2   Sq Epi: x / Non Sq Epi: Occasional / Bacteria: Occasional      CARDIAC MARKERS ( 02 Mar 2019 07:12 )  x     / x     / 72308 U/L / x     / x      CARDIAC MARKERS ( 01 Mar 2019 20:23 )  x     / x     / 69262 U/L / x     / x      CARDIAC MARKERS ( 01 Mar 2019 15:43 )  x     / x     / 50440 U/L / x     / x      CARDIAC MARKERS ( 01 Mar 2019 09:48 )  x     / x     / 42249 U/L / x     / x            Urinalysis Basic - ( 01 Mar 2019 15:45 )    Color: Zara / Appearance: Clear / S.015 / pH: x  Gluc: x / Ketone: Moderate  / Bili: Negative / Urobili: 1 mg/dL   Blood: x / Protein: 100 mg/dL / Nitrite: Positive   Leuk Esterase: Trace / RBC: Negative /HPF / WBC 0-2   Sq Epi: x / Non Sq Epi: Occasional / Bacteria: Occasional    RADIOLOGY/EKG:    < from: Xray Chest 1 View- PORTABLE-Urgent (19 @ 13:33) >    FINDINGS/  IMPRESSION:          The lungs are clear.  No pleural abnormality is seen.      Cardiomediastinal silhouette is intact.      < end of copied text >

## 2019-03-03 NOTE — PROGRESS NOTE ADULT - ASSESSMENT
28M  presenting to ED after heroin overdose  w prolonged immobilziation in  his room last night after passing out sitting on the floor.  woke up w/ numbness and tingling in his legs and unable to ambulate.       Rhabdomyolysis after Heroin overdose   + hematuria and neg blood on UA c/w myoglobinuria - higher risk for heme pigment renal  failure    and risk for continued rise in CPK after heroin overdose   will change IVF to bicarbonate based to alkalanize urine   monitor CPK , Mg, Phos ,Ca and K   daily labs     d/w mother at bedside  d.w Dr D'Amico Dr Yun to cover 3/2 - 3/3    Thank you for the courtesy of this consult. We will follow this patient with you.   Management is subject to change if new information becomes available or patient condition changes.    3/2 SY  --Rhabdomyolysis.   Renal function stable.  CPK slowly trending down.  Continue current IVF.  --Follow electrolytes closely.    3/3 SY  --Rhabdomyolysis.  Renal function remains stable.  CPK continues to trend down.   Continue IVF.  --Neuro follow up appreciated.   MRI of spine pending.  --1:1 observation to prevent substance abuse while inpt.    Dr. Fuentes to resume care 3/4.

## 2019-03-03 NOTE — PROGRESS NOTE ADULT - SUBJECTIVE AND OBJECTIVE BOX
Interval History:  3/3/19: He reports improvement in strength in right lower extremity but continues to have significant weakness in left lower extremity.  Was found on 3/2/19 with drugs in his possession and now has 1:1 supervision.  He continues to have significant pain.     MEDICATIONS  (STANDING):  amLODIPine   Tablet 5 milliGRAM(s) Oral at bedtime  clonazePAM Tablet 1 milliGRAM(s) Oral two times a day  enoxaparin Injectable 40 milliGRAM(s) SubCutaneous every 24 hours  melatonin 5 milliGRAM(s) Oral at bedtime  naloxone Injectable 0.4 milliGRAM(s) IV Push once  nicotine -  14 mG/24Hr(s) Patch 1 patch Transdermal daily  sodium bicarbonate  Infusion 0.134 mEq/kG/Hr (150 mL/Hr) IV Continuous <Continuous>    MEDICATIONS  (PRN):  acetaminophen   Tablet .. 650 milliGRAM(s) Oral every 6 hours PRN Mild Pain (1 - 3)  loperamide 2 milliGRAM(s) Oral every 4 hours PRN After each loose stool as needed for diarrhea  methadone    Tablet 5 milliGRAM(s) Oral every 4 hours PRN per guideline  methadone    Tablet 5 milliGRAM(s) Oral every 4 hours PRN per guideline  prochlorperazine   Tablet 10 milliGRAM(s) Oral every 8 hours PRN Nausea and/or Vomiting      Allergies    No Known Allergies    Intolerances        PHYSICAL EXAM:  Vital Signs Last 24 Hrs  T(F): 98.2 (19 @ 10:37)  HR: 76 (19 @ 10:37)  BP: 104/50 (19 @ 10:37)  RR: 16 (19 @ 10:37)    GENERAL: NAD, well-groomed, well-developed  HEAD:  Atraumatic, Normocephalic  Neuro:  Awake, alert, no aphasia  CN: PERRL, EOMI, no nystagmus, no facial weakness, tongue protrudes in the midline  motor: normal tone, full strength in bilateral upper extremities. 5/5 in b/l hip flexion. 5/5 in right knee flexion/extension, 3+/5 in right dorsiflexion, 5/5 in right plantar flexion  3/5 in left knee flexion, 4+/5 in left knee extension, 0/5 in left dorsiflexion/plantar flexion  sensory: slightly reduced light touch over left foot, joint position sense intact  coordination: finger to nose intact bilaterally  DTRs: trace in b/l triceps, biceps, radialis, 2/4 in b/l patellars, absent ankle jerks bilaterally    LABS:                        14.4   8.75  )-----------( 166      ( 02 Mar 2019 07:12 )             41.0     03-03    140  |  107  |  6<L>  ----------------------------<  116<H>  3.9   |  25  |  0.47<L>    Ca    8.1<L>      03 Mar 2019 07:31  Phos  3.8     03-03  Mg     2.2     03-03        Urinalysis Basic - ( 01 Mar 2019 15:45 )    Color: Zara / Appearance: Clear / S.015 / pH: x  Gluc: x / Ketone: Moderate  / Bili: Negative / Urobili: 1 mg/dL   Blood: x / Protein: 100 mg/dL / Nitrite: Positive   Leuk Esterase: Trace / RBC: Negative /HPF / WBC 0-2   Sq Epi: x / Non Sq Epi: Occasional / Bacteria: Occasional        RADIOLOGY & ADDITIONAL STUDIES:    lower extremity venous dopplers 3/1/19:  No evidence of bilateral lower extremity deep venous thrombosis.

## 2019-03-03 NOTE — PROGRESS NOTE ADULT - SUBJECTIVE AND OBJECTIVE BOX
NEPHROLOGY INTERVAL HPI/OVERNIGHT EVENTS:    Date of Service: 03-03-19 @ 16:33  3/3 SY.  Covering for Dr. Fuentes.  Now placed on 1:1 Observation due to syringe being found in pt's room post a visit from a friend yesterday.  Today, sleeping comfortably.  Denies any new complaints.   Able to move right leg without difficulty.  Continues to complain of decreased strength in LLE.    3/2 SY,  Covering for Dr. Fuentes  No acute events.  Very concerned due to LE weakness.    28M.  presenteing to ED after heroin overdose.  In his room last night and passed out sitting on the floor.  He woke up in the morning w/ numbness and tingling in his legs.  Was unable to ambulate. Continues to c/o b/l foot numbness and pain.   n ED, CPK 33K.  given 2L of isotonic saline.  Renal eval for rhabdomyloysis    today - pt seen earlier today and case d.w Dr D'Amico   lethargic arousable  mother at bedside  pt c/o (+) dark urine, L foot numbness, unable to move feet     MEDICATIONS  (STANDING):  amLODIPine   Tablet 5 milliGRAM(s) Oral at bedtime  clonazePAM Tablet 1 milliGRAM(s) Oral two times a day  enoxaparin Injectable 40 milliGRAM(s) SubCutaneous every 24 hours  melatonin 5 milliGRAM(s) Oral at bedtime  naloxone Injectable 0.4 milliGRAM(s) IV Push once  nicotine -  14 mG/24Hr(s) Patch 1 patch Transdermal daily  sodium bicarbonate  Infusion 0.134 mEq/kG/Hr (150 mL/Hr) IV Continuous <Continuous>    MEDICATIONS  (PRN):  acetaminophen   Tablet .. 650 milliGRAM(s) Oral every 6 hours PRN Mild Pain (1 - 3)  loperamide 2 milliGRAM(s) Oral every 4 hours PRN After each loose stool as needed for diarrhea  methadone    Tablet 5 milliGRAM(s) Oral every 4 hours PRN per guideline  methadone    Tablet 5 milliGRAM(s) Oral every 4 hours PRN per guideline  prochlorperazine   Tablet 10 milliGRAM(s) Oral every 8 hours PRN Nausea and/or Vomiting      Vital Signs Last 24 Hrs  T(C): 36.8 (03 Mar 2019 10:37), Max: 36.8 (03 Mar 2019 10:37)  T(F): 98.2 (03 Mar 2019 10:37), Max: 98.2 (03 Mar 2019 10:37)  HR: 76 (03 Mar 2019 10:37) (76 - 76)  BP: 104/50 (03 Mar 2019 10:37) (104/50 - 104/50)  BP(mean): --  RR: 16 (03 Mar 2019 10:37) (16 - 16)  SpO2: 98% (03 Mar 2019 10:37) (98% - 98%)  Daily     Daily     03-02 @ 07:01  -  03-03 @ 07:00  --------------------------------------------------------  IN: 1800 mL / OUT: 1350 mL / NET: 450 mL    03-03 @ 07:01  -  03-03 @ 16:33  --------------------------------------------------------  IN: 0 mL / OUT: 1600 mL / NET: -1600 mL        PHYSICAL EXAM:  Alert and responsive  GENERAL: No apparent distress.  LUNG : clear to aus  HEART: S1S2 RRR  ABDOMEN :soft  EXTREMITIES: no edema  SKIN:     LABS:                        14.4   8.75  )-----------( 166      ( 02 Mar 2019 07:12 )             41.0     03-03    140  |  107  |  6<L>  ----------------------------<  116<H>  3.9   |  25  |  0.47<L>    Ca    8.1<L>      03 Mar 2019 07:31  Phos  3.8     03-03  Mg     2.2     03-03          Magnesium, Serum: 2.2 mg/dL (03-03 @ 07:31)  Phosphorus Level, Serum: 3.8 mg/dL (03-03 @ 07:31)          RADIOLOGY & ADDITIONAL TESTS:

## 2019-03-04 DIAGNOSIS — F19.10 OTHER PSYCHOACTIVE SUBSTANCE ABUSE, UNCOMPLICATED: ICD-10-CM

## 2019-03-04 DIAGNOSIS — F11.20 OPIOID DEPENDENCE, UNCOMPLICATED: ICD-10-CM

## 2019-03-04 LAB
ANION GAP SERPL CALC-SCNC: 6 MMOL/L — SIGNIFICANT CHANGE UP (ref 5–17)
BUN SERPL-MCNC: 7 MG/DL — SIGNIFICANT CHANGE UP (ref 7–23)
CALCIUM SERPL-MCNC: 8.4 MG/DL — LOW (ref 8.5–10.1)
CHLORIDE SERPL-SCNC: 108 MMOL/L — SIGNIFICANT CHANGE UP (ref 96–108)
CK SERPL-CCNC: 9875 U/L — HIGH (ref 26–308)
CO2 SERPL-SCNC: 26 MMOL/L — SIGNIFICANT CHANGE UP (ref 22–31)
CREAT SERPL-MCNC: 0.62 MG/DL — SIGNIFICANT CHANGE UP (ref 0.5–1.3)
GLUCOSE SERPL-MCNC: 100 MG/DL — HIGH (ref 70–99)
MAGNESIUM SERPL-MCNC: 2.2 MG/DL — SIGNIFICANT CHANGE UP (ref 1.6–2.6)
MYOGLOBIN UR-MCNC: >5000 MCG/L — HIGH
PHOSPHATE SERPL-MCNC: 3.9 MG/DL — SIGNIFICANT CHANGE UP (ref 2.5–4.5)
POTASSIUM SERPL-MCNC: 4 MMOL/L — SIGNIFICANT CHANGE UP (ref 3.5–5.3)
POTASSIUM SERPL-SCNC: 4 MMOL/L — SIGNIFICANT CHANGE UP (ref 3.5–5.3)
SODIUM SERPL-SCNC: 140 MMOL/L — SIGNIFICANT CHANGE UP (ref 135–145)

## 2019-03-04 PROCEDURE — 99232 SBSQ HOSP IP/OBS MODERATE 35: CPT

## 2019-03-04 PROCEDURE — 72146 MRI CHEST SPINE W/O DYE: CPT | Mod: 26

## 2019-03-04 PROCEDURE — 90792 PSYCH DIAG EVAL W/MED SRVCS: CPT

## 2019-03-04 PROCEDURE — 72148 MRI LUMBAR SPINE W/O DYE: CPT | Mod: 26

## 2019-03-04 PROCEDURE — 73630 X-RAY EXAM OF FOOT: CPT | Mod: 26,LT

## 2019-03-04 RX ORDER — SODIUM CHLORIDE 9 MG/ML
1000 INJECTION INTRAMUSCULAR; INTRAVENOUS; SUBCUTANEOUS
Qty: 0 | Refills: 0 | Status: DISCONTINUED | OUTPATIENT
Start: 2019-03-04 | End: 2019-03-08

## 2019-03-04 RX ADMIN — Medication 1 PATCH: at 06:36

## 2019-03-04 RX ADMIN — ENOXAPARIN SODIUM 40 MILLIGRAM(S): 100 INJECTION SUBCUTANEOUS at 17:19

## 2019-03-04 RX ADMIN — Medication 150 MEQ/KG/HR: at 01:22

## 2019-03-04 RX ADMIN — Medication 650 MILLIGRAM(S): at 11:30

## 2019-03-04 RX ADMIN — Medication 1 PATCH: at 17:16

## 2019-03-04 RX ADMIN — SODIUM CHLORIDE 100 MILLILITER(S): 9 INJECTION INTRAMUSCULAR; INTRAVENOUS; SUBCUTANEOUS at 16:51

## 2019-03-04 RX ADMIN — Medication 150 MEQ/KG/HR: at 08:09

## 2019-03-04 RX ADMIN — Medication 650 MILLIGRAM(S): at 12:21

## 2019-03-04 RX ADMIN — Medication 1 PATCH: at 12:21

## 2019-03-04 RX ADMIN — METHADONE HYDROCHLORIDE 5 MILLIGRAM(S): 40 TABLET ORAL at 12:31

## 2019-03-04 RX ADMIN — Medication 5 MILLIGRAM(S): at 21:59

## 2019-03-04 RX ADMIN — Medication 1 MILLIGRAM(S): at 06:34

## 2019-03-04 RX ADMIN — Medication 650 MILLIGRAM(S): at 18:56

## 2019-03-04 RX ADMIN — Medication 1 MILLIGRAM(S): at 17:17

## 2019-03-04 RX ADMIN — Medication 1 PATCH: at 11:51

## 2019-03-04 NOTE — PHYSICAL THERAPY INITIAL EVALUATION ADULT - DIAGNOSIS, PT EVAL
Rhabdomyolysis after Heroin overdose; LE pain, B/L Foot Drop, numbness and tingling  likely has bilateral compression of the peroneal nerves due to the position in which he was seated when he lost consciousness

## 2019-03-04 NOTE — BEHAVIORAL HEALTH ASSESSMENT NOTE - NSBHCHARTREVIEWINVESTIGATE_PSY_A_CORE FT
Ventricular Rate 117 BPM    Atrial Rate 117 BPM    P-R Interval 122 ms    QRS Duration 82 ms    Q-T Interval 350 ms    QTC Calculation(Bezet) 488 ms    P Axis 63 degrees    R Axis 77 degrees    T Axis 21 degrees    Diagnosis Line Sinus tachycardia  Possible Left atrial enlargement  Borderline ECG  No previous ECGs available  Confirmed by VANE SORENSEN, NOMAN ([id number]) on 3/1/2019 4:23:10 PM

## 2019-03-04 NOTE — BEHAVIORAL HEALTH ASSESSMENT NOTE - HPI (INCLUDE ILLNESS QUALITY, SEVERITY, DURATION, TIMING, CONTEXT, MODIFYING FACTORS, ASSOCIATED SIGNS AND SYMPTOMS)
28 yoswm, lives with mother and step father, bio-father dies from Alcohol withdrawal, works as , no formal PPH until mandated treated in Sept 2018 s/p DWI and attends [facility].  No current psych meds but Pt reports is prescribe Klonopin 1 mg bid by DR Maye Yousif for "anxiety", no h/o inpt psych admissions, SA, cutting, violence, owns a registered hunting rifle, multiple past detox and rehabs, admitted to  s/p OD and Rhabdomyolysis, severe.  Psych consult for depression and OD.  Pt presents angry with blank affect.  Pt c/o feeling depressed in context of situation.  Denies SIIP and states he is unhappy about difficulty getting sober and now about medical condition and effect of Rhabdo on legs.  Pt irritated with questions of interview, and abruptly ended interview, asking how it was going to help him.  Pt was resultant to give name of doctor prescribing Klonopin, claiming he is anxious.  Although Pt understand he is addicted, has poor insight into effectiveness of tx and 12 step program, since none has worked for him and is highly defended when discussing use of Klonopin.  Pt minimizing second OD while in hospital when was found obtunded with hypo on floor and now is angry and hostile toward staff for being on 1 to 1 and having visitors prohibited.  Spoke with mother who reports Pt has made statements, he may be "better off dead" in past, but denies h/o self harm.  She also reported to staff, Pt is "pathological liar".  Pt declined need for psych admission to address depression and declined referral to in pt rehab after discharge, claiming he knows what to do on his own.  Pt also feels since he has a new upcoming appointment with  private addictions psychologist, Dr Brad Aranda, he feels he prefers that plan to any recommendations offered.  Denies acute psychiatric symptoms, denies SI/HI/AH/VH delusions and no evidence of timothy or psychosis.  Pt is cooperative but guarded, and dismissive with hostile demeanor.

## 2019-03-04 NOTE — BEHAVIORAL HEALTH ASSESSMENT NOTE - NSBHREFERDETAILS_PSY_A_CORE_FT
28M.  admitted 03/01/19.  presented to ED after heroin overdose.  was in his room last night and passed out sitting on the floor.  he woke up in the morning w/ numbness and tingling in his legs.  was unable to ambulate.  continues to c/o b/l foot numbness and pain.  heroin was injected into his R arm (he denies injecting in his legs or feet).  in ED, CPK 33K.  given 2L of isotonic saline.  ROS:  (+) dark urine, L foot numbness, unable to bear weight.  (-) SI.    3/2: Above reviewed; Patient complaining of a lot of pain in his legs; requesting pain meds;   3/3: yesterday nurse found patient to have pin point pupils. he looked high; A friend was there and syringe was found on the floor; Security was called and items were confiscated; patient had to be placed on 1:1; family spoke with staff and requesting a psych evaluation for his severe anxiety    Psych consult for depression and OD

## 2019-03-04 NOTE — BEHAVIORAL HEALTH ASSESSMENT NOTE - RISK ASSESSMENT
min risk of self harm in context of intentional, however high risk for accidental self harm or death due to drug use and maladaptive coping skills and noncompliance with recommendations.  Family is supportive.

## 2019-03-04 NOTE — PROGRESS NOTE ADULT - ASSESSMENT
Assessment: 29 y/o M pt with PMH polysubstance abuse, anxiety is seen by podiatry for:    - Left foot drop  - r/o compartment syndrome    Plan:  - Pt seen and evaluated; discussed plan with Dr. George Christensen.  - Labs and chart reviewed.  - Discussed condition with patient.  - Compartment syndrome has been ruled out as per surgery.   - Neuro consult appreciated. As per neuro, patient has bilateral compression of peroneal nerves due to his position in which he was seated in when he lost consciousness; recommend   - Recommend visit with pedorthotist with script for bilateral ankle foot orthosis as per neuro.   - PT consult appreciated; foot drop and steppage gait was noted on the Left side.  - Care as per medicine, appreciated.  - No podiatric surgical intervention warranted at this time.  - Podiatry signing off  - Reconsult PRN. Assessment: 27 y/o M pt with PMH polysubstance abuse, anxiety is seen by podiatry for:    - Left foot drop  - r/o compartment syndrome    Plan:  - Pt seen and evaluated; discussed plan with Dr. George Christensen.  - Labs and chart reviewed.  - Discussed condition with patient.  - Compartment syndrome has been ruled out as per surgery.   - Neuro consult appreciated. As per neuro, patient has bilateral compression of peroneal nerves due to his position in which he was seated in when he lost consciousness   - Recommend visit with pedorthotist with script for bilateral ankle foot orthosis as per neuro and formal out patient therapy  - PT consult appreciated; foot drop and steppage gait was noted on the Left side.  - Care as per medicine, appreciated.  - No podiatric surgical intervention warranted at this time.  - Podiatry signing off  - Reconsult PRN.

## 2019-03-04 NOTE — PHYSICAL THERAPY INITIAL EVALUATION ADULT - PATIENT PROFILE REVIEW, REHAB EVAL
3/3: yesterday nurse found patient to have pin point pupils. he looked high; A friend was there and syringe was found on the floor; Security was called and items were confiscated; patient had to be placed on 1:1; family spoke with staff and requesting a psych evaluation for his severe anxiety/yes

## 2019-03-04 NOTE — BEHAVIORAL HEALTH ASSESSMENT NOTE - SUMMARY
28 yoswm, lives with mother and step father, bio-father dies from Alcohol withdrawal, works as , no formal PPH until mandated treated in Sept 2018 s/p DWI and attends [facility].  No current psych meds but Pt reports is prescribe Klonopin 1 mg bid by DR Maye Yousif for "anxiety", no h/o inpt psych admissions, SA, cutting, violence, owns a registered hunting rifle, multiple past detox and rehabs, admitted to  s/p OD and Rhabdomyolysis, severe.  Psych consult for depression and OD.  Pt denies OD was SA and has no h/o SIB.  Admits to depression in context of "situation:"28 yoswm, lives with mother and step father, bio-father dies from Alcohol withdrawal, works as , no formal PPH until mandated treated in Sept 2018 s/p DWI and attends [facility].  No current psych meds but Pt reports is prescribe Klonopin 1 mg bid by DR Maye Yousif for "anxiety", no h/o inpt psych admissions, SA, cutting, violence, owns a registered hunting rifle, multiple past detox and rehabs, admitted to  s/p OD and Rhabdomyolysis, severe.  Psych consult for depression and OD.  Pt denies OD was a SA and has no h/o SIB.  Pt admits to depression in context of "situation" but has limited insight into he defense mechanisms which keep him from progress with substance abuse tx.  Pt declined any referrals or recommendations, claiming he has own planned treatment with addictions psychologist after leaving hospital, with Dr Brad Aranda.  Mother reports the new psychologist works with neurologist who prescribes Vivitrol.  Pt is not an imminent danger to self or others and is cleared psychiatrically for discharge.

## 2019-03-04 NOTE — PHYSICAL THERAPY INITIAL EVALUATION ADULT - GAIT PATTERN USED, PT EVAL
2-point gait/attempted to slow gait with 3 point pattern, patient patient unsafe with RW- getting too close to bar in front of RW.

## 2019-03-04 NOTE — PROGRESS NOTE ADULT - SUBJECTIVE AND OBJECTIVE BOX
Date of service: 3/4/2019    CC: bilateral foot drop, r/o compartment syndrome of Left foot    HPI: 28M patient was seen at bedside by podiatry for bilateral foot drop, r/o compartment syndrome of Left foot. Pt was seen resting in his bed and in NAD. Pt is currently on 1:1. Pt states that he took some pills and woke up sitting cross-legged on Friday morning (3/1). Pt states that he woke up with numbness and tingling in his legs and he was weak. He states that he called the ambulance. Pt states that his Right leg and foot regained sensation and he was able to move his right LE without issues. He states that he regained sensation in his Left leg, but he complains of numbness in his Left foot extending to his ankle. He also complains that he cannot move his Left foot. He denies any pain in his Left foot. He admits to paresthesias in Left foot. Pt states that he is able to ambulate with a walker but says that he must lift his Left leg higher because he cannot lift his Left foot. Pt was placed on 1:1 after he was caught with drugs in his room. He denies any f/c/n/v/sob.       PAST MEDICAL & SURGICAL HISTORY:  Drug abuse  Anxiety  No significant past surgical history    FHx:  (+) father AMI 40s. (01 Mar 2019 15:27)    SOCIAL HISTORY:  Tobacco history : admits to smoking  Alcohol history : denies  Drug history: admits to drug abuse    ALL:  NKDA.    Rx:  denies.    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  (+) IVDA.  prior attempts at rehab.  (+) tobacco.  (+) EtOH.      Allergies    No Known Allergies    Intolerances    MEDICATIONS  (STANDING):  clonazePAM Tablet 1 milliGRAM(s) Oral two times a day  enoxaparin Injectable 40 milliGRAM(s) SubCutaneous every 24 hours  melatonin 5 milliGRAM(s) Oral at bedtime  naloxone Injectable 0.4 milliGRAM(s) IV Push once  nicotine -  14 mG/24Hr(s) Patch 1 patch Transdermal daily  sodium chloride 0.9%. 1000 milliLiter(s) IV Continuous <Continuous>    MEDICATIONS  (PRN):  acetaminophen   Tablet .. 650 milliGRAM(s) Oral every 6 hours PRN Mild Pain (1 - 3)  loperamide 2 milliGRAM(s) Oral every 4 hours PRN After each loose stool as needed for diarrhea  methadone    Tablet 5 milliGRAM(s) Oral every 4 hours PRN per guideline  methadone    Tablet 5 milliGRAM(s) Oral every 4 hours PRN per guideline  prochlorperazine   Tablet 10 milliGRAM(s) Oral every 8 hours PRN Nausea and/or Vomiting      REVIEW OF SYSTEMS:    CONSTITUTIONAL: No fever, chills,  weight loss, or fatigue  EYES: No eye pain, visual disturbances, or discharge  ENMT:  No difficulty hearing, tinnitus, vertigo; No sinus or throat pain  NECK: No pain or stiffness  RESPIRATORY: No cough, wheezing, or hemoptysis; No shortness of breath  CARDIOVASCULAR: No chest pain, palpitations, dizziness, or leg swelling  GASTROINTESTINAL: No abdominal or epigastric pain. No nausea, vomiting, or hematemesis; No diarrhea or constipation. No melena or hematochezia.  GENITOURINARY: No dysuria, frequency, hematuria, or incontinence  NEUROLOGICAL: No headaches, memory loss, loss of strength, tremors; + paresthesias on Left foot, + Left foot drop  SKIN: No itching, burning, rashes, bruise on Left ankle   LYMPH NODES: No enlarged glands  ENDOCRINE: No heat or cold intolerance; No hair loss  MUSCULOSKELETAL: No joint pain or swelling; No muscle, back, or extremity pain  HEME/LYMPH: No easy bruising, or bleeding gums      VITALS:  Vital Signs Last 24 Hrs  T(C): 37.1 (03-04-19 @ 16:30), Max: 37.2 (03-03-19 @ 21:45)  T(F): 98.7 (03-04-19 @ 16:30), Max: 98.9 (03-03-19 @ 21:45)  HR: 81 (03-04-19 @ 16:30) (65 - 83)  BP: 118/56 (03-04-19 @ 16:30) (105/54 - 131/99)  BP(mean): --  RR: 18 (03-04-19 @ 16:30) (16 - 18)  SpO2: 98% (03-04-19 @ 16:30) (98% - 100%)        PHYSICAL EXAM:    Constitutional: AAOx3, non-focal; NAD, comfortable  Vasc: pedal pulses palpable. CRT immediate x10. Perfusion to all digits is immediate. TG WNL.    Derm: No open wounds. Skin is warm and well perfused and color is pink- no pallor noted. + Bruise noted on the posterior side of Left ankle. No crepitus noted b/l. No fluctuance noted b/l. No erythema noted b/l. No edema noted b/l. Pt is able to tolerate touch without complaint of pain. No acute signs of infection.     Neuro: Epicritic sensation intact on the digits of the Rt foot, absent on the Left foot.    Ortho: Pt is able to DF/PF on the Right foot. MSK 4/5 in all compartments on right foot- DF/PF. MSK 0/5 in all compartments on the Left foot. All compartments are soft and compressible. No pain out of proportion noted during exam.       LABS:    03-04    140  |  108  |  7   ----------------------------<  100<H>  4.0   |  26  |  0.62    Ca    8.4<L>      04 Mar 2019 07:24  Phos  3.9     03-04  Mg     2.2     03-04    Methadone, Urine (03.01.19 @ 15:11)    Methadone, Urine: Negative    Phencyclidine Level, Urine (03.01.19 @ 15:11)    Phencyclidine Level, Urine: Negative: INTERPRETATION OF URINE DRUG RESULTS  THE FOLLOWING CUT OFF CONCENTRATIONS ARE ESTABLISHED FOR THESE DRUGS:  DRUG CLASS                                              CUTOFF  LEVEL  AMPHETAMINES                                        1000 ng/mL  BARBITUATES                                             200   ng/mL  BENZODIAZEPINES                                    200   ng/mL  COCAINE METABOLITES                          300   ng/mL  CANNABINOIDS                                          50     ng/mL  METHADONE                                               300   ng/mL  OPIATES                                                        300   ng/mL  OXYCODONE                                               100   ng/mL  PHENCYCLIDINE                  25    ng/mL  PROPOXYPHENE                                         300  ng/mL  Urine drug screens are performed using the cut off levels listed. Results  are considered presumptive, require clinical correlation, and may be  subject to  cross reactivity with other drugs or metabolites. If clinically  indicated, confirmatory testing may be separately ordered. The laboratory  can supply a  reference of cross reacting or interfering substances.  THESE RESULTS SHOULD BE USED FOR MEDICAL PURPOSES ONLY AND NOT FOR ANY  LEGAL  OR EMPLOYMENT EVALUATIVE PURPOSES.    Barbiturates Screen, Urine (03.01.19 @ 15:11)    Barbiturates Screen, Urine: Negative    Opiate, Urine (03.01.19 @ 15:11)    Opiate, Urine: Positive    Amphetamine, Urine (03.01.19 @ 15:11)    Amphetamine, Urine: Negative    Cocaine Metabolite, Urine (03.01.19 @ 15:11)    Cocaine Metabolite, Urine: Negative    THC, Urine Qualitative (03.01.19 @ 15:11)    THC, Urine Qualitative: Negative    Benzodiazepine, Urine (03.01.19 @ 15:11)    Benzodiazepine, Urine: Positive      RADIOLOGY: Foot xrays performed. Official report pending. Unofficial read: no fractures noted.

## 2019-03-04 NOTE — PROGRESS NOTE ADULT - ASSESSMENT
28M  presenting to ED after heroin overdose  w prolonged immobilziation in  his room last night after passing out sitting on the floor.  woke up w/ numbness and tingling in his legs and unable to ambulate.     Rhabdomyolysis after Heroin overdose   + hematuria and neg blood on UA c/w myoglobinuria - higher risk for heme pigment renal  failure    CPK trending down    monitor CPK , Mg, Phos ,Ca and K closely    may change to isontonic saline    daily labs     Left Foot weakness   - Medicine and neurology fup     Thank you for the courtesy of this consult. We will follow this patient with you.   Management is subject to change if new information becomes available or patient condition changes. 28M  presenting to ED after heroin overdose  w prolonged immobilziation in  his room last night after passing out sitting on the floor.  woke up w/ numbness and tingling in his legs and unable to ambulate.     Rhabdomyolysis after Heroin overdose   + hematuria and neg blood on UA c/w myoglobinuria - higher risk for heme pigment renal  failure    CPK trending down    monitor CPK , Mg, Phos ,Ca and K closely    may change to isontonic saline    daily labs     Left Foot weakness   - Medicine and neurology fup     Will follow PRN     Thank you for the courtesy of this consult. We will follow this patient with you.   Management is subject to change if new information becomes available or patient condition changes.

## 2019-03-04 NOTE — BEHAVIORAL HEALTH ASSESSMENT NOTE - DESCRIPTION (FIRST USE, LAST USE, QUANTITY, FREQUENCY, DURATION)
h/o severe alcohol use with dependence, denies current relapse 2 days ago, chronic condition prescribed prescribed Benzo

## 2019-03-04 NOTE — PROGRESS NOTE ADULT - SUBJECTIVE AND OBJECTIVE BOX
HOSPITALIST PROGRESS NOTE:  SUBJECTIVE:  PCP: Dr. Humberto Vasquez.    Chief Complaint: Patient is a 28y old  Male who presents with a chief complaint of Patient is a 28y old  Male who presents with a chief complaint of heroin overdose. (01 Mar 2019 21:17)    HPI:  28M.  admitted 19.  presented to ED after heroin overdose.  was in his room last night and passed out sitting on the floor.  he woke up in the morning w/ numbness and tingling in his legs.  was unable to ambulate.  continues to c/o b/l foot numbness and pain.  heroin was injected into his R arm (he denies injecting in his legs or feet).  in ED, CPK 33K.  given 2L of isotonic saline.  ROS:  (+) dark urine, L foot numbness, unable to bear weight.  (-) SI.    3/2: Above reviewed; Patient complaining of a lot of pain in his legs; requesting pain meds;   3/3: yesterday nurse found patient to have pin point pupils. he looked high; A friend was there and syringe was found on the floor; Security was called and items were confiscated; patient had to be placed on 1:1; family spoke with staff and requesting a psych evaluation for his severe anxiety  3/4:  Yesterday gf was visiting and staff found a vaporizer in his room;  legs are improved but patient can not move his left foot or wiggle his toes, mother at     Allergies:  No Known Allergies    REVIEW OF SYSTEMS:  See HPI. All other review of systems is negative unless indicated above.     OBJECTIVE  Physical Exam:  Vital Signs Last 24 Hrs  T(C): 37 (04 Mar 2019 12:26), Max: 37.2 (03 Mar 2019 21:45)  T(F): 98.6 (04 Mar 2019 12:26), Max: 98.9 (03 Mar 2019 21:45)  HR: 66 (04 Mar 2019 12:26) (65 - 86)  BP: 116/59 (04 Mar 2019 12:26) (105/54 - 131/99)  BP(mean): --  RR: 18 (04 Mar 2019 12:26) (16 - 18)  SpO2: 100% (04 Mar 2019 12:26) (97% - 100%)    Constitutional: NAD, awake and alert, well-developed  Neurological: AAO x 3, no focal deficits  HEENT: PERRLA, EOMI, MMM  Neck: Soft and supple, No LAD, No JVD  Respiratory: Breath sounds are clear bilaterally, No wheezing, rales or rhonchi  Cardiovascular: S1 and S2, regular rate and rhythm; no Murmurs, gallops or rubs  Gastrointestinal: Bowel Sounds present, soft, nontender, nondistended, no guarding, no rebound tenderness  Back: No CVA tenderness   Extremities: No peripheral edema  Vascular: 2+ peripheral pulses  Musculoskeletal: decrease strength in B/L LE left >right; Unable to lift LLE fully due to pain  Skin: No rashes  Breast: Deferred  Rectal: Deferred    MEDICATIONS  (STANDING):  amLODIPine   Tablet 5 milliGRAM(s) Oral at bedtime  clonazePAM Tablet 1 milliGRAM(s) Oral two times a day  enoxaparin Injectable 40 milliGRAM(s) SubCutaneous every 24 hours  methadone    Tablet 10 milliGRAM(s) Oral daily  nicotine -  14 mG/24Hr(s) Patch 1 patch Transdermal daily  sodium bicarbonate  Infusion 0.134 mEq/kG/Hr (150 mL/Hr) IV Continuous <Continuous>    Lab Results:  CBC  CBC Full  -  ( 02 Mar 2019 07:12 )  WBC Count : 8.75 K/uL  Hemoglobin : 14.4 g/dL  Hematocrit : 41.0 %  Platelet Count - Automated : 166 K/uL  Mean Cell Volume : 88.0 fl  Mean Cell Hemoglobin : 30.9 pg  Mean Cell Hemoglobin Concentration : 35.1 gm/dL  Auto Neutrophil # : 4.99 K/uL  Auto Lymphocyte # : 2.56 K/uL  Auto Monocyte # : 1.08 K/uL  Auto Eosinophil # : 0.08 K/uL  Auto Basophil # : 0.02 K/uL  Auto Neutrophil % : 57.1 %  Auto Lymphocyte % : 29.3 %  Auto Monocyte % : 12.3 %  Auto Eosinophil % : 0.9 %  Auto Basophil % : 0.2 %    .		Differential:	[] Automated		[] Manual  Chemistry                        14.4   8.75  )-----------( 166      ( 02 Mar 2019 07:12 )             41.0     03-03    140  |  107  |  6<L>  ----------------------------<  116<H>  3.9   |  25  |  0.47<L>    Ca    8.1<L>      03 Mar 2019 07:31  Phos  3.8     03-03  Mg     2.2     03-03    Urinalysis Basic - ( 01 Mar 2019 15:45 )    Color: Zara / Appearance: Clear / S.015 / pH: x  Gluc: x / Ketone: Moderate  / Bili: Negative / Urobili: 1 mg/dL   Blood: x / Protein: 100 mg/dL / Nitrite: Positive   Leuk Esterase: Trace / RBC: Negative /HPF / WBC 0-2   Sq Epi: x / Non Sq Epi: Occasional / Bacteria: Occasional      CARDIAC MARKERS ( 02 Mar 2019 07:12 )  x     / x     / 93076 U/L / x     / x      CARDIAC MARKERS ( 01 Mar 2019 20:23 )  x     / x     / 34188 U/L / x     / x      CARDIAC MARKERS ( 01 Mar 2019 15:43 )  x     / x     / 10467 U/L / x     / x      CARDIAC MARKERS ( 01 Mar 2019 09:48 )  x     / x     / 18011 U/L / x     / x            Urinalysis Basic - ( 01 Mar 2019 15:45 )    Color: Zara / Appearance: Clear / S.015 / pH: x  Gluc: x / Ketone: Moderate  / Bili: Negative / Urobili: 1 mg/dL   Blood: x / Protein: 100 mg/dL / Nitrite: Positive   Leuk Esterase: Trace / RBC: Negative /HPF / WBC 0-2   Sq Epi: x / Non Sq Epi: Occasional / Bacteria: Occasional    RADIOLOGY/EKG:    < from: Xray Chest 1 View- PORTABLE-Urgent (19 @ 13:33) >    FINDINGS/  IMPRESSION:          The lungs are clear.  No pleural abnormality is seen.      Cardiomediastinal silhouette is intact.      < end of copied text > HOSPITALIST PROGRESS NOTE:  SUBJECTIVE:  PCP: Dr. Humberto Vasquez.    Chief Complaint: Patient is a 28y old  Male who presents with a chief complaint of Patient is a 28y old  Male who presents with a chief complaint of heroin overdose. (01 Mar 2019 21:17)    HPI:  28M.  admitted 19.  presented to ED after heroin overdose.  was in his room last night and passed out sitting on the floor.  he woke up in the morning w/ numbness and tingling in his legs.  was unable to ambulate.  continues to c/o b/l foot numbness and pain.  heroin was injected into his R arm (he denies injecting in his legs or feet).  in ED, CPK 33K.  given 2L of isotonic saline.  ROS:  (+) dark urine, L foot numbness, unable to bear weight.  (-) SI.    3/2: Above reviewed; Patient complaining of a lot of pain in his legs; requesting pain meds;   3/3: yesterday nurse found patient to have pin point pupils. he looked high; A friend was there and syringe was found on the floor; Security was called and items were confiscated; patient had to be placed on 1:1; family spoke with staff and requesting a psych evaluation for his severe anxiety  3/4:  Yesterday gf was visiting and staff found a vaporizer in his room;  legs are improved but patient can not move his left foot or wiggle his toes, mother at bedside with patients permission    Allergies:  No Known Allergies    REVIEW OF SYSTEMS:  See HPI. All other review of systems is negative unless indicated above.     OBJECTIVE  Physical Exam:  Vital Signs Last 24 Hrs  T(C): 37 (04 Mar 2019 12:26), Max: 37.2 (03 Mar 2019 21:45)  T(F): 98.6 (04 Mar 2019 12:26), Max: 98.9 (03 Mar 2019 21:45)  HR: 66 (04 Mar 2019 12:26) (65 - 86)  BP: 116/59 (04 Mar 2019 12:26) (105/54 - 131/99)  BP(mean): --  RR: 18 (04 Mar 2019 12:26) (16 - 18)  SpO2: 100% (04 Mar 2019 12:26) (97% - 100%)    Constitutional: NAD, awake and alert, well-developed  Neurological: AAO x 3, no focal deficits  HEENT: PERRLA, EOMI, MMM  Neck: Soft and supple, No LAD, No JVD  Respiratory: Breath sounds are clear bilaterally, No wheezing, rales or rhonchi  Cardiovascular: S1 and S2, regular rate and rhythm; no Murmurs, gallops or rubs  Gastrointestinal: Bowel Sounds present, soft, nontender, nondistended, no guarding, no rebound tenderness  Back: No CVA tenderness   Extremities: No peripheral edema  Vascular: 2+ peripheral pulses  Musculoskeletal: decrease strength in B/L LE left >right; Unable to lift LLE fully due to pain  Skin: No rashes  Breast: Deferred  Rectal: Deferred    MEDICATIONS  (STANDING):  amLODIPine   Tablet 5 milliGRAM(s) Oral at bedtime  clonazePAM Tablet 1 milliGRAM(s) Oral two times a day  enoxaparin Injectable 40 milliGRAM(s) SubCutaneous every 24 hours  methadone    Tablet 10 milliGRAM(s) Oral daily  nicotine -  14 mG/24Hr(s) Patch 1 patch Transdermal daily  sodium bicarbonate  Infusion 0.134 mEq/kG/Hr (150 mL/Hr) IV Continuous <Continuous>    Lab Results:  CBC    .		Differential:	[] Automated		[] Manual  Chemistry        140  |  108  |  7   ----------------------------<  100<H>  4.0   |  26  |  0.62    Ca    8.4<L>      04 Mar 2019 07:24  Phos  3.9     03-04  Mg     2.2     03-04      Urinalysis Basic - ( 01 Mar 2019 15:45 )    Color: Zara / Appearance: Clear / S.015 / pH: x  Gluc: x / Ketone: Moderate  / Bili: Negative / Urobili: 1 mg/dL   Blood: x / Protein: 100 mg/dL / Nitrite: Positive   Leuk Esterase: Trace / RBC: Negative /HPF / WBC 0-2   Sq Epi: x / Non Sq Epi: Occasional / Bacteria: Occasional      CARDIAC MARKERS ( 02 Mar 2019 07:12 )  x     / x     / 20737 U/L / x     / x      CARDIAC MARKERS ( 01 Mar 2019 20:23 )  x     / x     / 22566 U/L / x     / x      CARDIAC MARKERS ( 01 Mar 2019 15:43 )  x     / x     / 86266 U/L / x     / x      CARDIAC MARKERS ( 01 Mar 2019 09:48 )  x     / x     / 98662 U/L / x     / x            Urinalysis Basic - ( 01 Mar 2019 15:45 )    Color: Zara / Appearance: Clear / S.015 / pH: x  Gluc: x / Ketone: Moderate  / Bili: Negative / Urobili: 1 mg/dL   Blood: x / Protein: 100 mg/dL / Nitrite: Positive   Leuk Esterase: Trace / RBC: Negative /HPF / WBC 0-2   Sq Epi: x / Non Sq Epi: Occasional / Bacteria: Occasional    RADIOLOGY/EKG:    < from: Xray Chest 1 View- PORTABLE-Urgent (19 @ 13:33) >    FINDINGS/  IMPRESSION:          The lungs are clear.  No pleural abnormality is seen.      Cardiomediastinal silhouette is intact.      < end of copied text >    < from: MR Lumbar Spine No Cont (19 @ 10:01) >      IMPRESSION: Mild degenerative disc disease.Minimal bulge at L4-5 and   L5-S1 with tiny LEFT foraminal disc herniation at L5-S1. Cauda equina is   intact.Mild edema in the posterior paraspinal lumbar musculature of   uncertain etiology. Minimal free fluid seen within the pelvis.    < end of copied text >    < from: MR Thoracic Spine No Cont (19 @ 10:01) >    IMPRESSION: Mild degenerative disc disease.Minimal bulge at L4-5 and   L5-S1 with tiny LEFT foraminal disc herniation at L5-S1. Cauda equina is   intact.Mild edema in the posterior paraspinal lumbar musculature of   uncertain etiology. Minimal free fluid seen within the pelvis.            < end of copied text > HOSPITALIST PROGRESS NOTE:  SUBJECTIVE:  PCP: Dr. Arriaga    Chief Complaint: Patient is a 28y old  Male who presents with a chief complaint of Patient is a 28y old  Male who presents with a chief complaint of heroin overdose. (01 Mar 2019 21:17)    HPI:  28M.  admitted 19.  presented to ED after heroin overdose.  was in his room last night and passed out sitting on the floor.  he woke up in the morning w/ numbness and tingling in his legs.  was unable to ambulate.  continues to c/o b/l foot numbness and pain.  heroin was injected into his R arm (he denies injecting in his legs or feet).  in ED, CPK 33K.  given 2L of isotonic saline.  ROS:  (+) dark urine, L foot numbness, unable to bear weight.  (-) SI.    3/2: Above reviewed; Patient complaining of a lot of pain in his legs; requesting pain meds;   33: yesterday nurse found patient to have pin point pupils. he looked high; A friend was there and syringe was found on the floor; Security was called and items were confiscated; patient had to be placed on 1:1; family spoke with staff and requesting a psych evaluation for his severe anxiety  3/4:  Yesterday gf was visiting and staff found a vaporizer in his room;  legs are improved but patient can not move his left foot or wiggle his toes, mother at bedside with patients permission    Allergies:  No Known Allergies    REVIEW OF SYSTEMS:  See HPI. All other review of systems is negative unless indicated above.     OBJECTIVE  Physical Exam:  Vital Signs Last 24 Hrs  T(C): 37 (04 Mar 2019 12:26), Max: 37.2 (03 Mar 2019 21:45)  T(F): 98.6 (04 Mar 2019 12:26), Max: 98.9 (03 Mar 2019 21:45)  HR: 66 (04 Mar 2019 12:26) (65 - 86)  BP: 116/59 (04 Mar 2019 12:26) (105/54 - 131/99)  BP(mean): --  RR: 18 (04 Mar 2019 12:26) (16 - 18)  SpO2: 100% (04 Mar 2019 12:26) (97% - 100%)    Constitutional: NAD, awake and alert, well-developed  Neurological: AAO x 3, no focal deficits  HEENT: PERRLA, EOMI, MMM  Neck: Soft and supple, No LAD, No JVD  Respiratory: Breath sounds are clear bilaterally, No wheezing, rales or rhonchi  Cardiovascular: S1 and S2, regular rate and rhythm; no Murmurs, gallops or rubs  Gastrointestinal: Bowel Sounds present, soft, nontender, nondistended, no guarding, no rebound tenderness  Back: No CVA tenderness   Extremities: No peripheral edema  Vascular: 2+ peripheral pulses  Musculoskeletal: decrease strength in B/L LE left >right; Unable to lift LLE fully due to pain  Skin: No rashes  Breast: Deferred  Rectal: Deferred    MEDICATIONS  (STANDING):  amLODIPine   Tablet 5 milliGRAM(s) Oral at bedtime  clonazePAM Tablet 1 milliGRAM(s) Oral two times a day  enoxaparin Injectable 40 milliGRAM(s) SubCutaneous every 24 hours  methadone    Tablet 10 milliGRAM(s) Oral daily  nicotine -  14 mG/24Hr(s) Patch 1 patch Transdermal daily  sodium bicarbonate  Infusion 0.134 mEq/kG/Hr (150 mL/Hr) IV Continuous <Continuous>    Lab Results:  CBC    .		Differential:	[] Automated		[] Manual  Chemistry        140  |  108  |  7   ----------------------------<  100<H>  4.0   |  26  |  0.62    Ca    8.4<L>      04 Mar 2019 07:24  Phos  3.9     03-04  Mg     2.2     03-04      Urinalysis Basic - ( 01 Mar 2019 15:45 )    Color: Zara / Appearance: Clear / S.015 / pH: x  Gluc: x / Ketone: Moderate  / Bili: Negative / Urobili: 1 mg/dL   Blood: x / Protein: 100 mg/dL / Nitrite: Positive   Leuk Esterase: Trace / RBC: Negative /HPF / WBC 0-2   Sq Epi: x / Non Sq Epi: Occasional / Bacteria: Occasional      CARDIAC MARKERS ( 02 Mar 2019 07:12 )  x     / x     / 84686 U/L / x     / x      CARDIAC MARKERS ( 01 Mar 2019 20:23 )  x     / x     / 38887 U/L / x     / x      CARDIAC MARKERS ( 01 Mar 2019 15:43 )  x     / x     / 38375 U/L / x     / x      CARDIAC MARKERS ( 01 Mar 2019 09:48 )  x     / x     / 64973 U/L / x     / x            Urinalysis Basic - ( 01 Mar 2019 15:45 )    Color: Zara / Appearance: Clear / S.015 / pH: x  Gluc: x / Ketone: Moderate  / Bili: Negative / Urobili: 1 mg/dL   Blood: x / Protein: 100 mg/dL / Nitrite: Positive   Leuk Esterase: Trace / RBC: Negative /HPF / WBC 0-2   Sq Epi: x / Non Sq Epi: Occasional / Bacteria: Occasional    RADIOLOGY/EKG:    < from: Xray Chest 1 View- PORTABLE-Urgent (19 @ 13:33) >    FINDINGS/  IMPRESSION:          The lungs are clear.  No pleural abnormality is seen.      Cardiomediastinal silhouette is intact.      < end of copied text >    < from: MR Lumbar Spine No Cont (19 @ 10:01) >      IMPRESSION: Mild degenerative disc disease.Minimal bulge at L4-5 and   L5-S1 with tiny LEFT foraminal disc herniation at L5-S1. Cauda equina is   intact.Mild edema in the posterior paraspinal lumbar musculature of   uncertain etiology. Minimal free fluid seen within the pelvis.    < end of copied text >    < from: MR Thoracic Spine No Cont (19 @ 10:01) >    IMPRESSION: Mild degenerative disc disease.Minimal bulge at L4-5 and   L5-S1 with tiny LEFT foraminal disc herniation at L5-S1. Cauda equina is   intact.Mild edema in the posterior paraspinal lumbar musculature of   uncertain etiology. Minimal free fluid seen within the pelvis.            < end of copied text >

## 2019-03-04 NOTE — PROGRESS NOTE ADULT - ASSESSMENT
28 year old man with polysubstance abuse who used heroine and passed out while seated cross legged and who presents with lower extremity paresthesias and weakness.  He does have bilateral foot drop.   He likely has bilateral compression of the peroneal nerves due to the position in which he was seated when he lost consciousness.   MRI thoracic and lumbar spine shows some degenerative changes but no evidence of cauda equina compression.  Continue supportive care with IV hydration for treatment of rhabdomyolysis. CK is greatly elevated but is trending down.   Physical therapy consult.

## 2019-03-04 NOTE — PROGRESS NOTE ADULT - SUBJECTIVE AND OBJECTIVE BOX
Patient is a 28y old  Male who presents with a chief complaint of Patient is a 28y old  Male who presents with a chief complaint of heroin overdose. (03 Mar 2019 16:32)    HPI:  28M.  admitted 03/01/19.  presented to ED after heroin overdose.  was in his room last night and passed out sitting on the floor.  he woke up in the morning w/ numbness and tingling in his legs.  was unable to ambulate.  continues to c/o b/l foot numbness and pain.  heroin was injected into his R arm (he denies injecting in his legs or feet).  in ED, CPK 33K.  given 2L of isotonic saline.  3/4: Pt seen and examined, pain controlled, No headache, no neck pain. Neuro checks stable, moving left leg unable to flex or extend at left ankle, decreased sensation on left foot.. No SOB, no chest pain. No abdominal pain. No bony pelvis pain. Moves all extremities, no focal neurological complaint. No fever.    ROS:.  [X] A ten-point review of systems was otherwise negative except as noted.  Systemic:	[ ] Fever	[ ] Chills	[ ] Night sweats    [ ] Fatigue	[ ] Other  [] Cardiovascular:  [] Pulmonary:  [] Renal/Urologic:  [] Gastrointestinal: abdominal pain, vomiting  [] Metabolic:  [] Neurologic:  [] Hematologic:  [] ENT:  [] Ophthalmologic:  [] Musculoskeletal:    [ ] Due to altered mental status/intubation, subjective information were not able to be obtained from the patient. History was obtained, to the extent possible, from review of the chart and collateral sources of information.    PAST MEDICAL & SURGICAL HISTORY:  Drug abuse  No significant past surgical history    FAMILY HISTORY:    NC  Social history:     Alcohol: Denied  Smoking: Denied  Drug Use: Denied  ROS:  (+) dark urine, L foot numbness, unable to bear weight.  (-) SI.    PMHx:  anxiety;  polysubstance abuse.    PSHx:  clonazepam.    ALL:  NKDA.    Rx:  denies.    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  (+) IVDA.  prior attempts at rehab.  (+) tobacco.  (+) EtOH.    FHx:  (+) father AMI 40s. (01 Mar 2019 15:27)        Vital Signs Last 24 Hrs  T(C): 37 (04 Mar 2019 12:26), Max: 37.2 (03 Mar 2019 21:45)  T(F): 98.6 (04 Mar 2019 12:26), Max: 98.9 (03 Mar 2019 21:45)  HR: 66 (04 Mar 2019 12:26) (65 - 86)  BP: 116/59 (04 Mar 2019 12:26) (105/54 - 131/99)  BP(mean): --  RR: 18 (04 Mar 2019 12:26) (16 - 18)  SpO2: 100% (04 Mar 2019 12:26) (97% - 100%)  PHYSICAL EXAM:  Constitutional: NAD, GCS: 15/15  AOX3  Eyes:  WNL  ENMT:  WNL  Neck:  WNL, non tender  Back: Non tender  Respiratory: CTABL  Cardiovascular:  S1+S2+0  Gastrointestinal: Soft, ND , NT  Genitourinary:  WNL  Extremities: unable to extend and flex left ankle, with mild swelling left foot decreased sensation left foot, palpable pulses. Leg compartments soft.   Vascular:  Intact  Neurological: No focal neurological deficit,  CN, motor and sensory system grossly intact.  Skin: WNL  Musculoskeletal: WNL  Psychiatric: Grossly WNL      Labs:        03-04    140  |  108  |  7   ----------------------------<  100<H>  4.0   |  26  |  0.62    Ca    8.4<L>      04 Mar 2019 07:24  Phos  3.9     03-04  Mg     2.2     03-04            Radiology:    < from: US Duplex Venous Lower Ext Complete, Bilateral (03.01.19 @ 17:09) >    IMPRESSION:     No evidence of bilateral lower extremity deep venous thrombosis. Patient is a 28y old  Male who presents with a chief complaint of Patient is a 28y old  Male who presents with a chief complaint of heroin overdose. (03 Mar 2019 16:32)    HPI:  28M.  admitted 03/01/19.  presented to ED after heroin overdose.  was in his room last night and passed out sitting on the floor.  he woke up in the morning w/ numbness and tingling in his legs.  was unable to ambulate.  continues to c/o b/l foot numbness and pain.  heroin was injected into his R arm (he denies injecting in his legs or feet).  in ED, CPK 33K.  given 2L of isotonic saline.  3/4: Pt seen and examined, pain controlled, No headache, no neck pain. Neuro checks stable, moving left leg unable to flex or extend at left ankle, decreased sensation on left foot.. No SOB, no chest pain. No abdominal pain. No bony pelvis pain. Moves all extremities, no focal neurological complaint. No fever.    ROS:.  [X] A ten-point review of systems was otherwise negative except as noted.  Systemic:	[ ] Fever	[ ] Chills	[ ] Night sweats    [ ] Fatigue	[ ] Other  [] Cardiovascular:  [] Pulmonary:  [] Renal/Urologic:  [] Gastrointestinal: abdominal pain, vomiting  [] Metabolic:  [] Neurologic:  [] Hematologic:  [] ENT:  [] Ophthalmologic:  [] Musculoskeletal:    [ ] Due to altered mental status/intubation, subjective information were not able to be obtained from the patient. History was obtained, to the extent possible, from review of the chart and collateral sources of information.    PAST MEDICAL & SURGICAL HISTORY:  Drug abuse  No significant past surgical history    FAMILY HISTORY:    NC  Social history:     Alcohol: Denied  Smoking: Denied  Drug Use: Denied  ROS:  (+) dark urine, L foot numbness, unable to bear weight.  (-) SI.    PMHx:  anxiety;  polysubstance abuse.    PSHx:  clonazepam.    ALL:  NKDA.    Rx:  denies.    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  (+) IVDA.  prior attempts at rehab.  (+) tobacco.  (+) EtOH.    FHx:  (+) father AMI 40s. (01 Mar 2019 15:27)        Vital Signs Last 24 Hrs  T(C): 37 (04 Mar 2019 12:26), Max: 37.2 (03 Mar 2019 21:45)  T(F): 98.6 (04 Mar 2019 12:26), Max: 98.9 (03 Mar 2019 21:45)  HR: 66 (04 Mar 2019 12:26) (65 - 86)  BP: 116/59 (04 Mar 2019 12:26) (105/54 - 131/99)  BP(mean): --  RR: 18 (04 Mar 2019 12:26) (16 - 18)  SpO2: 100% (04 Mar 2019 12:26) (97% - 100%)  PHYSICAL EXAM:  Constitutional: NAD, GCS: 15/15  AOX3  Eyes:  WNL  ENMT:  WNL  Neck:  WNL, non tender  Back: Non tender  Respiratory: CTABL  Cardiovascular:  S1+S2+0  Gastrointestinal: Soft, ND , NT  Genitourinary:  WNL  Extremities: unable to extend and flex left ankle, with mild swelling left foot decreased sensation left foot, palpable pulses. Leg compartments soft.   Vascular:  Intact  Neurological: No focal neurological deficit,  CN, motor and sensory system grossly intact.  Skin: WNL  Musculoskeletal: WNL  Psychiatric: Grossly WNL      Labs:        03-04    140  |  108  |  7   ----------------------------<  100<H>  4.0   |  26  |  0.62    Ca    8.4<L>      04 Mar 2019 07:24  Phos  3.9     03-04  Mg     2.2     03-04      Creatine Kinase, Serum in AM (03.04.19 @ 07:24)    Creatine Kinase, Serum: 9875 U/L          Radiology:    < from: US Duplex Venous Lower Ext Complete, Bilateral (03.01.19 @ 17:09) >    IMPRESSION:     No evidence of bilateral lower extremity deep venous thrombosis.

## 2019-03-04 NOTE — PHYSICAL THERAPY INITIAL EVALUATION ADULT - ACTIVE RANGE OF MOTION EXAMINATION, REHAB EVAL
except L ankle PROM WNLs/bilateral upper extremity Active ROM was WFL (within functional limits)/bilateral  lower extremity Active ROM was WFL (within functional limits)

## 2019-03-04 NOTE — BEHAVIORAL HEALTH ASSESSMENT NOTE - NSBHCHARTREVIEWLAB_PSY_A_CORE FT
Creatine Kinase, Serum in AM (03.04.19 @ 07:24)    Creatine Kinase, Serum: 9875 U/L  Basic Metabolic Panel (03.04.19 @ 07:24)    Sodium, Serum: 140 mmol/L    Potassium, Serum: 4.0 mmol/L    Chloride, Serum: 108 mmol/L    Carbon Dioxide, Serum: 26 mmol/L    Anion Gap, Serum: 6 mmol/L    Blood Urea Nitrogen, Serum: 7 mg/dL    Creatinine, Serum: 0.62 mg/dL    Glucose, Serum: 100 mg/dL    Calcium, Total Serum: 8.4 mg/dL

## 2019-03-04 NOTE — PROGRESS NOTE ADULT - ASSESSMENT
19 y old male S/P OD with Rhabdomyolysis, Distal lower ext dysfunction, CPK trending down. Able to lift left leg now but not ankle.  Medical management per primary service  No evidence for compartment syndrome  Continue hydration   F/U labs, trend CPK  PT  If concerned about foot compartment syndrome consider podiatry consult, foot slightly swollen  but soft.  No acute surgical intervention for pt at this time, reconsult PRN  D/W Nursing staff, family at bedside.

## 2019-03-04 NOTE — PROGRESS NOTE ADULT - ASSESSMENT
*Rhabdomyolysis after Heroin overdose   -Positive hematuria and neg blood on UA c/w myoglobinuria - higher risk for heme pigment renal  failure   and risk for continued rise in CPK after heroin overdose  -IVF with bicarbonate  -Monitor CPK,Mg, Phos ,Ca and K Daily  -Monitor Urine Output   -renal consult appreciated     *LE pain, B/L Foot Drop, numbness and tingling most likely 2ndry to rhabdo R/O transverse myelitis or spinal cord vasculitis.  -bilateral foot drop. likely has bilateral compression of the peroneal nerves due to the position in which he was seated when he lost consciousness  -neuro consult appreciated -  May need bilateral ankle foot orthoses.   -FU MRI if spine  -PT evaluation    *IV Heroin Overdose   *Hx of Anxiety and Polysusbtance Abuse   -Start COWS protocol with Low dose 5 day methadone Taper  -SW + Case Management.    *DVT ppx Lovenox *Rhabdomyolysis after Heroin overdose   -Positive hematuria and neg blood on UA c/w myoglobinuria - higher risk for heme pigment renal  failure   and risk for continued rise in CPK after heroin overdose  -CPK trending down  -IVF with bicarbonate  -Monitor CPK,Mg, Phos ,Ca and K Daily  -Monitor Urine Output   -renal consult appreciated     *LE pain, B/L Foot Drop, numbness and tingling most likely 2ndry to rhabdo R/O transverse myelitis or spinal cord vasculitis.  -bilateral foot drop. likely has bilateral compression of the peroneal nerves due to the position in which he was seated when he lost consciousness  -neuro consult appreciated -  May need bilateral ankle foot orthoses.   -MRI spine Noted- Discussed with Neuro  -PT evaluation    *IV Heroin Overdose   *Hx of Anxiety and Polysusbtance Abuse   -Start COWS protocol with Low dose 5 day methadone Taper  -SW + Case Management.    *DVT ppx Lovenox *Rhabdomyolysis after Heroin overdose   -Positive hematuria and neg blood on UA c/w myoglobinuria - higher risk for heme pigment renal  failure   and risk for continued rise in CPK after heroin overdose  -CPK trending down  -IVF with bicarbonate  -Monitor CPK,Mg, Phos ,Ca and K Daily  -Monitor Urine Output   -renal consult appreciated     *LE pain, B/L Foot Drop, numbness and tingling most likely 2ndry to rhabdo   -bilateral foot drop. likely has bilateral compression of the peroneal nerves due to the position in which he was seated when he lost consciousness  -neuro consult appreciated -  May need bilateral ankle foot orthoses.   -MRI spine Noted- Discussed with Neuro  -Surgery consult appreciated - no evidence of compartment syndrom  -Podiatry consult   -PT evaluation    *IV Heroin Overdose   *Hx of Anxiety and Polysusbtance Abuse   -Start COWS protocol with Low dose 5 day methadone Taper  -SW + Case Management.    *DVT ppx Lovenox

## 2019-03-04 NOTE — PHYSICAL THERAPY INITIAL EVALUATION ADULT - PERTINENT HX OF CURRENT PROBLEM, REHAB EVAL
29 yo M admitted post heroin overdose. Patient  was in his room last night and passed out sitting on the floor.  he woke up in the morning w/ numbness and tingling in his legs, was unable to ambulate. He continues to c/o b/l foot numbness and pain. MRI TS/LS (-)

## 2019-03-04 NOTE — BEHAVIORAL HEALTH ASSESSMENT NOTE - NSBHCHARTREVIEWVS_PSY_A_CORE FT
ICU Vital Signs Last 24 Hrs  T(C): 37 (04 Mar 2019 12:26), Max: 37.2 (03 Mar 2019 21:45)  T(F): 98.6 (04 Mar 2019 12:26), Max: 98.9 (03 Mar 2019 21:45)  HR: 66 (04 Mar 2019 12:26) (65 - 86)  BP: 116/59 (04 Mar 2019 12:26) (105/54 - 131/99)  BP(mean): --  ABP: --  ABP(mean): --  RR: 18 (04 Mar 2019 12:26) (16 - 18)  SpO2: 100% (04 Mar 2019 12:26) (97% - 100%)

## 2019-03-04 NOTE — BEHAVIORAL HEALTH ASSESSMENT NOTE - NSBHSUICPROTECTFACT_PSY_A_CORE
Responsibility to family and others/Future oriented/Supportive social network or family/Identifies reasons for living/Engaged in work or school/Positive therapeutic relationships

## 2019-03-04 NOTE — PROGRESS NOTE ADULT - SUBJECTIVE AND OBJECTIVE BOX
Interval History:  3/3/19: He reports improvement in strength in right lower extremity but continues to have significant weakness in left lower extremity.  Was found on 3/2/19 with drugs in his possession and now has 1:1 supervision.  He continues to have significant pain.   3/4/19: Continues to report numbness and weakness in left foot      MEDICATIONS  (STANDING):  amLODIPine   Tablet 5 milliGRAM(s) Oral at bedtime  clonazePAM Tablet 1 milliGRAM(s) Oral two times a day  enoxaparin Injectable 40 milliGRAM(s) SubCutaneous every 24 hours  melatonin 5 milliGRAM(s) Oral at bedtime  naloxone Injectable 0.4 milliGRAM(s) IV Push once  nicotine -  14 mG/24Hr(s) Patch 1 patch Transdermal daily  sodium bicarbonate  Infusion 0.134 mEq/kG/Hr (150 mL/Hr) IV Continuous <Continuous>    MEDICATIONS  (PRN):  acetaminophen   Tablet .. 650 milliGRAM(s) Oral every 6 hours PRN Mild Pain (1 - 3)  loperamide 2 milliGRAM(s) Oral every 4 hours PRN After each loose stool as needed for diarrhea  methadone    Tablet 5 milliGRAM(s) Oral every 4 hours PRN per guideline  methadone    Tablet 5 milliGRAM(s) Oral every 4 hours PRN per guideline  prochlorperazine   Tablet 10 milliGRAM(s) Oral every 8 hours PRN Nausea and/or Vomiting      Allergies    No Known Allergies    Intolerances        PHYSICAL EXAM:  Vital Signs Last 24 Hrs  T(F): 98.6 (03-04-19 @ 12:26)  HR: 66 (03-04-19 @ 12:26)  BP: 116/59 (03-04-19 @ 12:26)  RR: 18 (03-04-19 @ 12:26)    GENERAL: NAD, well-groomed, well-developed  HEAD:  Atraumatic, Normocephalic  Neuro:  Awake, alert, no aphasia  CN: PERRL, EOMI, no nystagmus, no facial weakness, tongue protrudes in the midline  motor: normal tone, full strength in bilateral upper extremities. 5/5 in b/l hip flexion. 5/5 in right knee flexion/extension, 4+/5 in right dorsiflexion, 5/5 in right plantar flexion  4/5 in left knee flexion, 5/5 in left knee extension, 0/5 in left dorsiflexion/plantar flexion  sensory: slightly reduced light touch over left foot, joint position sense intact  coordination: finger to nose intact bilaterally  DTRs: trace in b/l triceps, biceps, radialis, 2/4 in b/l patellars, absent ankle jerks bilaterally      LABS:    03-04    140  |  108  |  7   ----------------------------<  100<H>  4.0   |  26  |  0.62    Ca    8.4<L>      04 Mar 2019 07:24  Phos  3.9     03-04  Mg     2.2     03-04            RADIOLOGY & ADDITIONAL STUDIES:    lower extremity venous dopplers 3/1/19:  No evidence of bilateral lower extremity deep venous thrombosis.      MRI thoracic and lumbar spine 3/4/19:  Mild degenerative disc disease.Minimal bulge at L4-5 and   L5-S1 with tiny LEFT foraminal disc herniation at L5-S1. Cauda equina is   intact.Mild edema in the posterior paraspinal lumbar musculature of   uncertain etiology. Minimal free fluid seen within the pelvis.

## 2019-03-04 NOTE — PHYSICAL THERAPY INITIAL EVALUATION ADULT - MODALITIES TREATMENT COMMENTS
Patient left OOB in chair with CBIR., 1:1 present.  Patient instructed to call for assistance back to bed or the bathroom, understands same. RN informed of session.

## 2019-03-05 LAB
ANION GAP SERPL CALC-SCNC: 7 MMOL/L — SIGNIFICANT CHANGE UP (ref 5–17)
BUN SERPL-MCNC: 12 MG/DL — SIGNIFICANT CHANGE UP (ref 7–23)
CALCIUM SERPL-MCNC: 8.4 MG/DL — LOW (ref 8.5–10.1)
CHLORIDE SERPL-SCNC: 111 MMOL/L — HIGH (ref 96–108)
CK SERPL-CCNC: 6487 U/L — HIGH (ref 26–308)
CO2 SERPL-SCNC: 24 MMOL/L — SIGNIFICANT CHANGE UP (ref 22–31)
CREAT SERPL-MCNC: 0.64 MG/DL — SIGNIFICANT CHANGE UP (ref 0.5–1.3)
GLUCOSE SERPL-MCNC: 98 MG/DL — SIGNIFICANT CHANGE UP (ref 70–99)
MAGNESIUM SERPL-MCNC: 2.1 MG/DL — SIGNIFICANT CHANGE UP (ref 1.6–2.6)
PHOSPHATE SERPL-MCNC: 4.3 MG/DL — SIGNIFICANT CHANGE UP (ref 2.5–4.5)
POTASSIUM SERPL-MCNC: 3.9 MMOL/L — SIGNIFICANT CHANGE UP (ref 3.5–5.3)
POTASSIUM SERPL-SCNC: 3.9 MMOL/L — SIGNIFICANT CHANGE UP (ref 3.5–5.3)
SODIUM SERPL-SCNC: 142 MMOL/L — SIGNIFICANT CHANGE UP (ref 135–145)

## 2019-03-05 PROCEDURE — 99232 SBSQ HOSP IP/OBS MODERATE 35: CPT

## 2019-03-05 PROCEDURE — 93010 ELECTROCARDIOGRAM REPORT: CPT

## 2019-03-05 RX ORDER — NICOTINE POLACRILEX 2 MG
2 GUM BUCCAL EVERY 6 HOURS
Qty: 0 | Refills: 0 | Status: DISCONTINUED | OUTPATIENT
Start: 2019-03-05 | End: 2019-03-08

## 2019-03-05 RX ADMIN — Medication 1 MILLIGRAM(S): at 05:35

## 2019-03-05 RX ADMIN — ENOXAPARIN SODIUM 40 MILLIGRAM(S): 100 INJECTION SUBCUTANEOUS at 17:40

## 2019-03-05 RX ADMIN — Medication 1 PATCH: at 08:28

## 2019-03-05 RX ADMIN — Medication 2 MILLIGRAM(S): at 22:05

## 2019-03-05 RX ADMIN — SODIUM CHLORIDE 100 MILLILITER(S): 9 INJECTION INTRAMUSCULAR; INTRAVENOUS; SUBCUTANEOUS at 17:42

## 2019-03-05 RX ADMIN — METHADONE HYDROCHLORIDE 5 MILLIGRAM(S): 40 TABLET ORAL at 11:41

## 2019-03-05 RX ADMIN — Medication 1 MILLIGRAM(S): at 17:39

## 2019-03-05 RX ADMIN — Medication 2 MILLIGRAM(S): at 16:06

## 2019-03-05 RX ADMIN — Medication 5 MILLIGRAM(S): at 22:02

## 2019-03-05 RX ADMIN — Medication 650 MILLIGRAM(S): at 22:04

## 2019-03-05 NOTE — PROGRESS NOTE ADULT - SUBJECTIVE AND OBJECTIVE BOX
Interval History:  3/3/19: He reports improvement in strength in right lower extremity but continues to have significant weakness in left lower extremity.  Was found on 3/2/19 with drugs in his possession and now has 1:1 supervision.  He continues to have significant pain.   3/4/19: Continues to report numbness and weakness in left foot  3/5/19: He is starting to feel some tingling in left foot. Was given an AFO and has been ambulating with AFO and walker      MEDICATIONS  (STANDING):  clonazePAM Tablet 1 milliGRAM(s) Oral two times a day  enoxaparin Injectable 40 milliGRAM(s) SubCutaneous every 24 hours  melatonin 5 milliGRAM(s) Oral at bedtime  methadone    Tablet 5 milliGRAM(s) Oral daily  naloxone Injectable 0.4 milliGRAM(s) IV Push once  nicotine -  14 mG/24Hr(s) Patch 1 patch Transdermal daily  sodium chloride 0.9%. 1000 milliLiter(s) (100 mL/Hr) IV Continuous <Continuous>    MEDICATIONS  (PRN):  acetaminophen   Tablet .. 650 milliGRAM(s) Oral every 6 hours PRN Mild Pain (1 - 3)  loperamide 2 milliGRAM(s) Oral every 4 hours PRN After each loose stool as needed for diarrhea  methadone    Tablet 5 milliGRAM(s) Oral every 4 hours PRN per guideline  methadone    Tablet 5 milliGRAM(s) Oral every 4 hours PRN per guideline  prochlorperazine   Tablet 10 milliGRAM(s) Oral every 8 hours PRN Nausea and/or Vomiting      Allergies    No Known Allergies    Intolerances        PHYSICAL EXAM:  Vital Signs Last 24 Hrs  T(F): 98.5 (03-05-19 @ 04:46)  HR: 62 (03-05-19 @ 04:46)  BP: 102/50 (03-05-19 @ 04:46)  RR: 18 (03-05-19 @ 04:46)    GENERAL: NAD, well-groomed, well-developed  HEAD:  Atraumatic, Normocephalic  Neuro:  Awake, alert, no aphasia  CN: PERRL, EOMI, no nystagmus, no facial weakness, tongue protrudes in the midline  motor: normal tone, full strength in all four extremities except for 0/5 in left plantar and dorsiflexion  sensory: decreased sensation in left foot  coordination: finger to nose intact bilaterally  DTRs: 2/4 in b/l biceps, radialis, patellars    LABS:    03-05    142  |  111<H>  |  12  ----------------------------<  98  3.9   |  24  |  0.64    Ca    8.4<L>      05 Mar 2019 07:30  Phos  4.3     03-05  Mg     2.1     03-05            RADIOLOGY & ADDITIONAL STUDIES:      PLAN:    lower extremity venous dopplers 3/1/19:  No evidence of bilateral lower extremity deep venous thrombosis.      MRI thoracic and lumbar spine 3/4/19:  Mild degenerative disc disease.Minimal bulge at L4-5 and   L5-S1 with tiny LEFT foraminal disc herniation at L5-S1. Cauda equina is   intact.Mild edema in the posterior paraspinal lumbar musculature of   uncertain etiology. Minimal free fluid seen within the pelvis.

## 2019-03-05 NOTE — PROGRESS NOTE ADULT - SUBJECTIVE AND OBJECTIVE BOX
HOSPITALIST PROGRESS NOTE:  SUBJECTIVE:  PCP: Dr. Arriaga    Chief Complaint: Patient is a 28y old  Male who presents with a chief complaint of Patient is a 28y old  Male who presents with a chief complaint of heroin overdose. (01 Mar 2019 21:17)    HPI:  28M.  admitted 19.  presented to ED after heroin overdose.  was in his room last night and passed out sitting on the floor.  he woke up in the morning w/ numbness and tingling in his legs.  was unable to ambulate.  continues to c/o b/l foot numbness and pain.  heroin was injected into his R arm (he denies injecting in his legs or feet).  in ED, CPK 33K.  given 2L of isotonic saline.  ROS:  (+) dark urine, L foot numbness, unable to bear weight.  (-) SI.    3/2: Above reviewed; Patient complaining of a lot of pain in his legs; requesting pain meds;   3/3: yesterday nurse found patient to have pin point pupils. he looked high; A friend was there and syringe was found on the floor; Security was called and items were confiscated; patient had to be placed on 1:1; family spoke with staff and requesting a psych evaluation for his severe anxiety  3/:  Yesterday gf was visiting and staff found a vaporizer in his room;  legs are improved but patient can not move his left foot or wiggle his toes, mother at bedside with patients permission  3/5:  Patient started walking with the walker; Hes still not able to move his left foot but can move his left Leg; No other issues    Allergies:  No Known Allergies    REVIEW OF SYSTEMS:  See HPI. All other review of systems is negative unless indicated above.     OBJECTIVE  Physical Exam:  Vital Signs Last 24 Hrs  T(C): 36.7 (05 Mar 2019 10:50), Max: 37.1 (04 Mar 2019 16:30)  T(F): 98.1 (05 Mar 2019 10:50), Max: 98.7 (04 Mar 2019 16:30)  HR: 82 (05 Mar 2019 10:50) (62 - 82)  BP: 127/66 (05 Mar 2019 10:50) (102/50 - 127/66)  BP(mean): --  RR: 16 (05 Mar 2019 10:50) (16 - 18)  SpO2: 98% (05 Mar 2019 10:50) (97% - 98%)    Constitutional: NAD, awake and alert, well-developed  Neurological: AAO x 3, no focal deficits  HEENT: PERRLA, EOMI, MMM  Neck: Soft and supple, No LAD, No JVD  Respiratory: Breath sounds are clear bilaterally, No wheezing, rales or rhonchi  Cardiovascular: S1 and S2, regular rate and rhythm; no Murmurs, gallops or rubs  Gastrointestinal: Bowel Sounds present, soft, nontender, nondistended, no guarding, no rebound tenderness  Back: No CVA tenderness   Extremities: No peripheral edema  Vascular: 2+ peripheral pulses  Musculoskeletal: decrease strength in B/L LE left >right; Unable to lift LLE fully due to pain  Skin: No rashes  Breast: Deferred  Rectal: Deferred    MEDICATIONS  (STANDING):  amLODIPine   Tablet 5 milliGRAM(s) Oral at bedtime  clonazePAM Tablet 1 milliGRAM(s) Oral two times a day  enoxaparin Injectable 40 milliGRAM(s) SubCutaneous every 24 hours  methadone    Tablet 10 milliGRAM(s) Oral daily  nicotine -  14 mG/24Hr(s) Patch 1 patch Transdermal daily  sodium bicarbonate  Infusion 0.134 mEq/kG/Hr (150 mL/Hr) IV Continuous <Continuous>    Lab Results:  CBC    .		Differential:	[] Automated		[] Manual  Chemistry        142  |  111<H>  |  12  ----------------------------<  98  3.9   |  24  |  0.64    Ca    8.4<L>      05 Mar 2019 07:30  Phos  4.3     -05  Mg     2.1     03-05    Urinalysis Basic - ( 01 Mar 2019 15:45 )    Color: Zara / Appearance: Clear / S.015 / pH: x  Gluc: x / Ketone: Moderate  / Bili: Negative / Urobili: 1 mg/dL   Blood: x / Protein: 100 mg/dL / Nitrite: Positive   Leuk Esterase: Trace / RBC: Negative /HPF / WBC 0-2   Sq Epi: x / Non Sq Epi: Occasional / Bacteria: Occasional      CARDIAC MARKERS ( 02 Mar 2019 07:12 )  x     / x     / 99491 U/L / x     / x      CARDIAC MARKERS ( 01 Mar 2019 20:23 )  x     / x     / 10550 U/L / x     / x      CARDIAC MARKERS ( 01 Mar 2019 15:43 )  x     / x     / 89023 U/L / x     / x      CARDIAC MARKERS ( 01 Mar 2019 09:48 )  x     / x     / 19925 U/L / x     / x        RADIOLOGY/EKG:    < from: Xray Chest 1 View- PORTABLE-Urgent (19 @ 13:33) >    FINDINGS/  IMPRESSION:          The lungs are clear.  No pleural abnormality is seen.      Cardiomediastinal silhouette is intact.      < end of copied text >    < from: MR Lumbar Spine No Cont (19 @ 10:01) >      IMPRESSION: Mild degenerative disc disease.Minimal bulge at L4-5 and   L5-S1 with tiny LEFT foraminal disc herniation at L5-S1. Cauda equina is   intact.Mild edema in the posterior paraspinal lumbar musculature of   uncertain etiology. Minimal free fluid seen within the pelvis.    < end of copied text >    < from: MR Thoracic Spine No Cont (19 @ 10:01) >    IMPRESSION: Mild degenerative disc disease.Minimal bulge at L4-5 and   L5-S1 with tiny LEFT foraminal disc herniation at L5-S1. Cauda equina is   intact.Mild edema in the posterior paraspinal lumbar musculature of   uncertain etiology. Minimal free fluid seen within the pelvis.            < end of copied text >

## 2019-03-05 NOTE — PROGRESS NOTE ADULT - ASSESSMENT
28 year old man with polysubstance abuse who used heroine and passed out while seated cross legged and who presents with lower extremity paresthesias and weakness.  He does have bilateral foot drop.   He likely has bilateral compression of the peroneal nerves due to the position in which he was seated when he lost consciousness.   EMG after discharge to confirm diagnosis.  MRI thoracic and lumbar spine shows some degenerative changes but no evidence of cauda equina compression.  Continue supportive care with IV hydration for treatment of rhabdomyolysis. CK is is trending down.   Physical therapy recommendations appreciated.  Continue use of AFO on left foot.  Will follow intermittently as needed.

## 2019-03-05 NOTE — PROGRESS NOTE ADULT - ASSESSMENT
*Rhabdomyolysis after Heroin overdose   -Positive hematuria and neg blood on UA c/w myoglobinuria - higher risk for heme pigment renal  failure   and risk for continued rise in CPK after heroin overdose  -CPK trending down  -S/P IVF with bicarbonate switch to NS  -Monitor CPK,Mg, Phos ,Ca and K Daily  -Monitor Urine Output   -renal consult appreciated     *LE pain, L Foot Drop, numbness and tingling most likely 2ndry to rhabdo   -bilateral foot drop. likely has bilateral compression of the peroneal nerves due to the position in which he was seated when he lost consciousness  -neuro consult appreciated -  May need bilateral ankle foot orthoses.   -MRI spine Noted no evidence of cauda equina compression.- Discussed with Neuro  -Surgery consult appreciated - no evidence of compartment syndrome  -Podiatry consult appreciated - Recommend visit with pedorthotist with script for bilateral ankle foot orthosis as per neuro.  -PT evaluation  -Outpatient EMG studies     *IV Heroin Overdose   *Hx of Anxiety and Polysusbtance Abuse   -Start COWS protocol with Low dose 5 day methadone Taper; EKG - Normal QTC  -SW + Case Management.    *DVT ppx Lovenox

## 2019-03-06 LAB
ADD ON TEST-SPECIMEN IN LAB: SIGNIFICANT CHANGE UP
ANION GAP SERPL CALC-SCNC: 10 MMOL/L — SIGNIFICANT CHANGE UP (ref 5–17)
BUN SERPL-MCNC: 13 MG/DL — SIGNIFICANT CHANGE UP (ref 7–23)
CALCIUM SERPL-MCNC: 8.3 MG/DL — LOW (ref 8.5–10.1)
CHLORIDE SERPL-SCNC: 109 MMOL/L — HIGH (ref 96–108)
CK SERPL-CCNC: 4317 U/L — HIGH (ref 26–308)
CO2 SERPL-SCNC: 23 MMOL/L — SIGNIFICANT CHANGE UP (ref 22–31)
CREAT SERPL-MCNC: 0.62 MG/DL — SIGNIFICANT CHANGE UP (ref 0.5–1.3)
GLUCOSE SERPL-MCNC: 106 MG/DL — HIGH (ref 70–99)
HCT VFR BLD CALC: 39.9 % — SIGNIFICANT CHANGE UP (ref 39–50)
HGB BLD-MCNC: 13.9 G/DL — SIGNIFICANT CHANGE UP (ref 13–17)
MAGNESIUM SERPL-MCNC: 2.3 MG/DL — SIGNIFICANT CHANGE UP (ref 1.6–2.6)
MCHC RBC-ENTMCNC: 31 PG — SIGNIFICANT CHANGE UP (ref 27–34)
MCHC RBC-ENTMCNC: 34.8 GM/DL — SIGNIFICANT CHANGE UP (ref 32–36)
MCV RBC AUTO: 88.9 FL — SIGNIFICANT CHANGE UP (ref 80–100)
NRBC # BLD: 0 /100 WBCS — SIGNIFICANT CHANGE UP (ref 0–0)
PHOSPHATE SERPL-MCNC: 5 MG/DL — HIGH (ref 2.5–4.5)
PLATELET # BLD AUTO: 250 K/UL — SIGNIFICANT CHANGE UP (ref 150–400)
POTASSIUM SERPL-MCNC: 3.9 MMOL/L — SIGNIFICANT CHANGE UP (ref 3.5–5.3)
POTASSIUM SERPL-SCNC: 3.9 MMOL/L — SIGNIFICANT CHANGE UP (ref 3.5–5.3)
RBC # BLD: 4.49 M/UL — SIGNIFICANT CHANGE UP (ref 4.2–5.8)
RBC # FLD: 12 % — SIGNIFICANT CHANGE UP (ref 10.3–14.5)
SODIUM SERPL-SCNC: 142 MMOL/L — SIGNIFICANT CHANGE UP (ref 135–145)
WBC # BLD: 6.15 K/UL — SIGNIFICANT CHANGE UP (ref 3.8–10.5)
WBC # FLD AUTO: 6.15 K/UL — SIGNIFICANT CHANGE UP (ref 3.8–10.5)

## 2019-03-06 PROCEDURE — 99232 SBSQ HOSP IP/OBS MODERATE 35: CPT

## 2019-03-06 RX ADMIN — SODIUM CHLORIDE 100 MILLILITER(S): 9 INJECTION INTRAMUSCULAR; INTRAVENOUS; SUBCUTANEOUS at 11:28

## 2019-03-06 RX ADMIN — Medication 5 MILLIGRAM(S): at 23:09

## 2019-03-06 RX ADMIN — Medication 650 MILLIGRAM(S): at 23:40

## 2019-03-06 RX ADMIN — SODIUM CHLORIDE 100 MILLILITER(S): 9 INJECTION INTRAMUSCULAR; INTRAVENOUS; SUBCUTANEOUS at 01:11

## 2019-03-06 RX ADMIN — Medication 650 MILLIGRAM(S): at 23:09

## 2019-03-06 RX ADMIN — Medication 1 MILLIGRAM(S): at 18:54

## 2019-03-06 RX ADMIN — SODIUM CHLORIDE 100 MILLILITER(S): 9 INJECTION INTRAMUSCULAR; INTRAVENOUS; SUBCUTANEOUS at 21:45

## 2019-03-06 RX ADMIN — Medication 1 MILLIGRAM(S): at 06:07

## 2019-03-06 RX ADMIN — Medication 1 PATCH: at 19:19

## 2019-03-06 RX ADMIN — Medication 1 PATCH: at 11:29

## 2019-03-06 RX ADMIN — ENOXAPARIN SODIUM 40 MILLIGRAM(S): 100 INJECTION SUBCUTANEOUS at 18:54

## 2019-03-06 NOTE — DIETITIAN INITIAL EVALUATION ADULT. - OTHER INFO
Pt seen for LOS, identified at nutritional risk due to h/x of drug use. Pt's with pmhx of drug use. Pt in hospital after heroin use. Pt passed out and when waking he was unable to get off the floor. Pt being treated for Rhabdomyolysis and currently with numbness in left leg. Of note pt during hospital course had appeared high and a syringe was found on floor. Pt placed on 1:1. Pt very brief with RD. Pt states no changes in appetite, was eating about 2 meals a day with no changes in weight. Pt states overall normal intake and appetite. Pt denies chewing or swallowing difficulty. Pt noted with no n/v/d/c. Pt current weight appears elevated from UBW of 175lbs. no edema noted edinson 20. Pt overall appears well nourished. c/w current nutrition plan will follow as needed at low risk.

## 2019-03-06 NOTE — PROGRESS NOTE ADULT - SUBJECTIVE AND OBJECTIVE BOX
HOSPITALIST PROGRESS NOTE:  SUBJECTIVE:  PCP: Dr. Arriaga    Chief Complaint: Patient is a 28y old  Male who presents with a chief complaint of Patient is a 28y old  Male who presents with a chief complaint of heroin overdose. (01 Mar 2019 21:17)    HPI:  28M.  admitted 19.  presented to ED after heroin overdose.  was in his room last night and passed out sitting on the floor.  he woke up in the morning w/ numbness and tingling in his legs.  was unable to ambulate.  continues to c/o b/l foot numbness and pain.  heroin was injected into his R arm (he denies injecting in his legs or feet).  in ED, CPK 33K.  given 2L of isotonic saline.  ROS:  (+) dark urine, L foot numbness, unable to bear weight.  (-) SI.    3/2: Above reviewed; Patient complaining of a lot of pain in his legs; requesting pain meds;   33: yesterday nurse found patient to have pin point pupils. he looked high; A friend was there and syringe was found on the floor; Security was called and items were confiscated; patient had to be placed on 1:1; family spoke with staff and requesting a psych evaluation for his severe anxiety  3/4:  Yesterday gf was visiting and staff found a vaporizer in his room;  legs are improved but patient can not move his left foot or wiggle his toes, mother at bedside with patients permission  3/5:  Patient started walking with the walker; Hes still not able to move his left foot but can move his left Leg; No other issues  3/6:  hes still having difficult time moving the left foot; But overall he has been improving. No overnight events. todays the last day of the methadone taper; patient refusing inpatient detox/rehab    Allergies:  No Known Allergies    REVIEW OF SYSTEMS:  See HPI. All other review of systems is negative unless indicated above.     OBJECTIVE  Physical Exam:  Vital Signs Last 24 Hrs  T(C): 36.8 (06 Mar 2019 11:18), Max: 36.8 (05 Mar 2019 17:26)  T(F): 98.2 (06 Mar 2019 11:18), Max: 98.3 (05 Mar 2019 23:28)  HR: 88 (06 Mar 2019 11:18) (64 - 88)  BP: 112/51 (06 Mar 2019 11:18) (100/52 - 114/77)  BP(mean): --  RR: 18 (06 Mar 2019 11:18) (18 - 18)  SpO2: 100% (06 Mar 2019 11:18) (97% - 100%)    Constitutional: NAD, awake and alert, well-developed  Neurological: AAO x 3, no focal deficits  HEENT: PERRLA, EOMI, MMM  Neck: Soft and supple, No LAD, No JVD  Respiratory: Breath sounds are clear bilaterally, No wheezing, rales or rhonchi  Cardiovascular: S1 and S2, regular rate and rhythm; no Murmurs, gallops or rubs  Gastrointestinal: Bowel Sounds present, soft, nontender, nondistended, no guarding, no rebound tenderness  Back: No CVA tenderness   Extremities: No peripheral edema  Vascular: 2+ peripheral pulses  Musculoskeletal: decrease strength in B/L LE left >right; Unable to lift LLE fully due to pain  Skin: No rashes  Breast: Deferred  Rectal: Deferred    MEDICATIONS  (STANDING):  amLODIPine   Tablet 5 milliGRAM(s) Oral at bedtime  clonazePAM Tablet 1 milliGRAM(s) Oral two times a day  enoxaparin Injectable 40 milliGRAM(s) SubCutaneous every 24 hours  methadone    Tablet 10 milliGRAM(s) Oral daily  nicotine -  14 mG/24Hr(s) Patch 1 patch Transdermal daily  sodium bicarbonate  Infusion 0.134 mEq/kG/Hr (150 mL/Hr) IV Continuous <Continuous>    Lab Results:  CBC  CBC Full  -  ( 06 Mar 2019 07:34 )  WBC Count : 6.15 K/uL  Hemoglobin : 13.9 g/dL  Hematocrit : 39.9 %  Platelet Count - Automated : 250 K/uL  Mean Cell Volume : 88.9 fl  Mean Cell Hemoglobin : 31.0 pg  Mean Cell Hemoglobin Concentration : 34.8 gm/dL  Auto Neutrophil # : x  Auto Lymphocyte # : x  Auto Monocyte # : x  Auto Eosinophil # : x  Auto Basophil # : x  Auto Neutrophil % : x  Auto Lymphocyte % : x  Auto Monocyte % : x  Auto Eosinophil % : x  Auto Basophil % : x    .		Differential:	[] Automated		[] Manual  Chemistry                        13.9   6.15  )-----------( 250      ( 06 Mar 2019 07:34 )             39.9     03-06    142  |  109<H>  |  13  ----------------------------<  106<H>  3.9   |  23  |  0.62    Ca    8.3<L>      06 Mar 2019 07:34  Phos  5.0       Mg     2.3           Color: Zara / Appearance: Clear / S.015 / pH: x  Gluc: x / Ketone: Moderate  / Bili: Negative / Urobili: 1 mg/dL   Blood: x / Protein: 100 mg/dL / Nitrite: Positive   Leuk Esterase: Trace / RBC: Negative /HPF / WBC 0-2   Sq Epi: x / Non Sq Epi: Occasional / Bacteria: Occasional      CARDIAC MARKERS ( 02 Mar 2019 07:12 )  x     / x     / 14125 U/L / x     / x      CARDIAC MARKERS ( 01 Mar 2019 20:23 )  x     / x     / 22340 U/L / x     / x      CARDIAC MARKERS ( 01 Mar 2019 15:43 )  x     / x     / 76023 U/L / x     / x      CARDIAC MARKERS ( 01 Mar 2019 09:48 )  x     / x     / 07088 U/L / x     / x        RADIOLOGY/EKG:    < from: Xray Chest 1 View- PORTABLE-Urgent (19 @ 13:33) >    FINDINGS/  IMPRESSION:          The lungs are clear.  No pleural abnormality is seen.      Cardiomediastinal silhouette is intact.      < end of copied text >    < from: MR Lumbar Spine No Cont (19 @ 10:01) >      IMPRESSION: Mild degenerative disc disease.Minimal bulge at L4-5 and   L5-S1 with tiny LEFT foraminal disc herniation at L5-S1. Cauda equina is   intact.Mild edema in the posterior paraspinal lumbar musculature of   uncertain etiology. Minimal free fluid seen within the pelvis.    < end of copied text >    < from: MR Thoracic Spine No Cont (19 @ 10:01) >    IMPRESSION: Mild degenerative disc disease.Minimal bulge at L4-5 and   L5-S1 with tiny LEFT foraminal disc herniation at L5-S1. Cauda equina is   intact.Mild edema in the posterior paraspinal lumbar musculature of   uncertain etiology. Minimal free fluid seen within the pelvis.            < end of copied text >

## 2019-03-06 NOTE — DIETITIAN INITIAL EVALUATION ADULT. - PERTINENT LABORATORY DATA
03-06 Na142 mmol/L Glu 106 mg/dL<H> K+ 3.9 mmol/L Cr  0.62 mg/dL BUN 13 mg/dL Phos 5.0 mg/dL<H> Alb n/a   PAB n/a

## 2019-03-06 NOTE — PROGRESS NOTE ADULT - SUBJECTIVE AND OBJECTIVE BOX
Interval History:  3/3/19: He reports improvement in strength in right lower extremity but continues to have significant weakness in left lower extremity.  Was found on 3/2/19 with drugs in his possession and now has 1:1 supervision.  He continues to have significant pain.   3/4/19: Continues to report numbness and weakness in left foot  3/5/19: He is starting to feel some tingling in left foot. Was given an AFO and has been ambulating with AFO and walker  3/6/19: No new complaints. He has been ambulating with AFO and walker. No significant improvement in left foot weakness.       MEDICATIONS  (STANDING):  clonazePAM Tablet 1 milliGRAM(s) Oral two times a day  enoxaparin Injectable 40 milliGRAM(s) SubCutaneous every 24 hours  melatonin 5 milliGRAM(s) Oral at bedtime  naloxone Injectable 0.4 milliGRAM(s) IV Push once  nicotine -  14 mG/24Hr(s) Patch 1 patch Transdermal daily  sodium chloride 0.9%. 1000 milliLiter(s) (100 mL/Hr) IV Continuous <Continuous>    MEDICATIONS  (PRN):  acetaminophen   Tablet .. 650 milliGRAM(s) Oral every 6 hours PRN Mild Pain (1 - 3)  loperamide 2 milliGRAM(s) Oral every 4 hours PRN After each loose stool as needed for diarrhea  methadone    Tablet 5 milliGRAM(s) Oral every 4 hours PRN per guideline  methadone    Tablet 5 milliGRAM(s) Oral daily PRN per guideline  nicotine  Polacrilex Gum 2 milliGRAM(s) Oral every 6 hours PRN tobacco use  prochlorperazine   Tablet 10 milliGRAM(s) Oral every 8 hours PRN Nausea and/or Vomiting      Allergies    No Known Allergies    Intolerances        PHYSICAL EXAM:  Vital Signs Last 24 Hrs  T(F): 98.2 (03-06-19 @ 11:18)  HR: 88 (03-06-19 @ 11:18)  BP: 112/51 (03-06-19 @ 11:18)  RR: 18 (03-06-19 @ 11:18)      GENERAL: NAD, well-groomed, well-developed  HEAD:  Atraumatic, Normocephalic  Neuro:  Awake, alert, no aphasia  CN: PERRL, EOMI, no nystagmus, no facial weakness, tongue protrudes in the midline  motor: normal tone, full strength in all four extremities except for 0/5 in left plantar and dorsiflexion  sensory: decreased sensation in left foot  coordination: finger to nose intact bilaterally  DTRs: 2/4 in b/l biceps, radialis, patellars        LABS:                        13.9   6.15  )-----------( 250      ( 06 Mar 2019 07:34 )             39.9     03-06    142  |  109<H>  |  13  ----------------------------<  106<H>  3.9   |  23  |  0.62    Ca    8.3<L>      06 Mar 2019 07:34  Phos  5.0     03-06  Mg     2.3     03-06            RADIOLOGY & ADDITIONAL STUDIES:    lower extremity venous dopplers 3/1/19:  No evidence of bilateral lower extremity deep venous thrombosis.      MRI thoracic and lumbar spine 3/4/19:  Mild degenerative disc disease.Minimal bulge at L4-5 and   L5-S1 with tiny LEFT foraminal disc herniation at L5-S1. Cauda equina is   intact.Mild edema in the posterior paraspinal lumbar musculature of   uncertain etiology. Minimal free fluid seen within the pelvis.

## 2019-03-06 NOTE — PROGRESS NOTE ADULT - ASSESSMENT
*Rhabdomyolysis after Heroin overdose   -Positive hematuria and neg blood on UA c/w myoglobinuria - higher risk for heme pigment renal  failure   and risk for continued rise in CPK after heroin overdose  -CPK trending down  -S/P IVF with bicarbonate switch to NS  -Monitor CPK,Mg, Phos ,Ca and K Daily  -Monitor Urine Output   -renal consult appreciated     *LE pain, L Foot Drop, numbness and tingling most likely 2ndry to rhabdo   -L foot drop. likely has bilateral compression of the peroneal nerves due to the position in which he was seated when he lost consciousness  -neuro consult appreciated -  May need bilateral ankle foot orthoses.   -MRI spine Noted no evidence of cauda equina compression.- Discussed with Neuro  -Surgery consult appreciated - no evidence of compartment syndrome  -Podiatry consult appreciated - Recommend visit with pedorthotist with script for bilateral ankle foot orthosis as per neuro.  -PT evaluation  -Outpatient EMG studies     *IV Heroin Overdose   *Hx of Anxiety and Polysusbtance Abuse   -Start COWS protocol with Low dose 5 day methadone Taper; EKG - Normal QTC  -SW + Case Management.    *DVT ppx Lovenox

## 2019-03-06 NOTE — DIETITIAN INITIAL EVALUATION ADULT. - PERTINENT MEDS FT
MEDICATIONS  (STANDING):  clonazePAM Tablet 1 milliGRAM(s) Oral two times a day  enoxaparin Injectable 40 milliGRAM(s) SubCutaneous every 24 hours  melatonin 5 milliGRAM(s) Oral at bedtime  naloxone Injectable 0.4 milliGRAM(s) IV Push once  nicotine -  14 mG/24Hr(s) Patch 1 patch Transdermal daily  sodium chloride 0.9%. 1000 milliLiter(s) (100 mL/Hr) IV Continuous <Continuous>    MEDICATIONS  (PRN):  acetaminophen   Tablet .. 650 milliGRAM(s) Oral every 6 hours PRN Mild Pain (1 - 3)  loperamide 2 milliGRAM(s) Oral every 4 hours PRN After each loose stool as needed for diarrhea  methadone    Tablet 5 milliGRAM(s) Oral every 4 hours PRN per guideline  methadone    Tablet 5 milliGRAM(s) Oral daily PRN per guideline  nicotine  Polacrilex Gum 2 milliGRAM(s) Oral every 6 hours PRN tobacco use  prochlorperazine   Tablet 10 milliGRAM(s) Oral every 8 hours PRN Nausea and/or Vomiting

## 2019-03-06 NOTE — PROGRESS NOTE ADULT - ASSESSMENT
28 year old man with polysubstance abuse who used heroine and passed out while seated cross legged and who presents with lower extremity paresthesias and weakness.  He does have bilateral foot drop.   He likely has bilateral compression of the peroneal nerves due to the position in which he was seated when he lost consciousness.   EMG after discharge to confirm diagnosis.  MRI thoracic and lumbar spine shows some degenerative changes but no evidence of cauda equina compression.  Continue supportive care with IV hydration for treatment of rhabdomyolysis. CK is is trending down.   Continue PT. Use of AFO and walker for ambulating.  I explained to the patient that it will take months to see improvement.  Will sign off. Please call back if additional input from neurology service is needed.

## 2019-03-07 LAB
ANION GAP SERPL CALC-SCNC: 9 MMOL/L — SIGNIFICANT CHANGE UP (ref 5–17)
BUN SERPL-MCNC: 14 MG/DL — SIGNIFICANT CHANGE UP (ref 7–23)
CALCIUM SERPL-MCNC: 8.6 MG/DL — SIGNIFICANT CHANGE UP (ref 8.5–10.1)
CHLORIDE SERPL-SCNC: 110 MMOL/L — HIGH (ref 96–108)
CK SERPL-CCNC: 2325 U/L — HIGH (ref 26–308)
CO2 SERPL-SCNC: 23 MMOL/L — SIGNIFICANT CHANGE UP (ref 22–31)
CREAT SERPL-MCNC: 0.59 MG/DL — SIGNIFICANT CHANGE UP (ref 0.5–1.3)
GLUCOSE SERPL-MCNC: 106 MG/DL — HIGH (ref 70–99)
MAGNESIUM SERPL-MCNC: 2.4 MG/DL — SIGNIFICANT CHANGE UP (ref 1.6–2.6)
PHOSPHATE SERPL-MCNC: 4.6 MG/DL — HIGH (ref 2.5–4.5)
POTASSIUM SERPL-MCNC: 3.8 MMOL/L — SIGNIFICANT CHANGE UP (ref 3.5–5.3)
POTASSIUM SERPL-SCNC: 3.8 MMOL/L — SIGNIFICANT CHANGE UP (ref 3.5–5.3)
SODIUM SERPL-SCNC: 142 MMOL/L — SIGNIFICANT CHANGE UP (ref 135–145)

## 2019-03-07 RX ORDER — GABAPENTIN 400 MG/1
300 CAPSULE ORAL EVERY 12 HOURS
Qty: 0 | Refills: 0 | Status: DISCONTINUED | OUTPATIENT
Start: 2019-03-07 | End: 2019-03-08

## 2019-03-07 RX ORDER — TRAMADOL HYDROCHLORIDE 50 MG/1
50 TABLET ORAL EVERY 6 HOURS
Qty: 0 | Refills: 0 | Status: DISCONTINUED | OUTPATIENT
Start: 2019-03-07 | End: 2019-03-08

## 2019-03-07 RX ORDER — GABAPENTIN 400 MG/1
100 CAPSULE ORAL ONCE
Qty: 0 | Refills: 0 | Status: COMPLETED | OUTPATIENT
Start: 2019-03-07 | End: 2019-03-07

## 2019-03-07 RX ADMIN — Medication 1 PATCH: at 09:58

## 2019-03-07 RX ADMIN — GABAPENTIN 100 MILLIGRAM(S): 400 CAPSULE ORAL at 03:06

## 2019-03-07 RX ADMIN — TRAMADOL HYDROCHLORIDE 50 MILLIGRAM(S): 50 TABLET ORAL at 09:34

## 2019-03-07 RX ADMIN — TRAMADOL HYDROCHLORIDE 50 MILLIGRAM(S): 50 TABLET ORAL at 18:00

## 2019-03-07 RX ADMIN — ENOXAPARIN SODIUM 40 MILLIGRAM(S): 100 INJECTION SUBCUTANEOUS at 17:55

## 2019-03-07 RX ADMIN — Medication 1 PATCH: at 09:59

## 2019-03-07 RX ADMIN — TRAMADOL HYDROCHLORIDE 50 MILLIGRAM(S): 50 TABLET ORAL at 15:10

## 2019-03-07 RX ADMIN — GABAPENTIN 300 MILLIGRAM(S): 400 CAPSULE ORAL at 17:55

## 2019-03-07 RX ADMIN — Medication 1 PATCH: at 18:00

## 2019-03-07 RX ADMIN — Medication 1 MILLIGRAM(S): at 03:19

## 2019-03-07 RX ADMIN — Medication 1 MILLIGRAM(S): at 17:56

## 2019-03-07 RX ADMIN — Medication 5 MILLIGRAM(S): at 22:57

## 2019-03-07 RX ADMIN — TRAMADOL HYDROCHLORIDE 50 MILLIGRAM(S): 50 TABLET ORAL at 17:56

## 2019-03-07 NOTE — PROGRESS NOTE ADULT - SUBJECTIVE AND OBJECTIVE BOX
HOSPITALIST PROGRESS NOTE:  SUBJECTIVE:  PCP: Dr. Arriaga    Chief Complaint: Patient is a 28y old  Male who presents with a chief complaint of Patient is a 28y old  Male who presents with a chief complaint of heroin overdose. (01 Mar 2019 21:17)    HPI:  28M.  admitted 19.  presented to ED after heroin overdose.  was in his room last night and passed out sitting on the floor.  he woke up in the morning w/ numbness and tingling in his legs.  was unable to ambulate.  continues to c/o b/l foot numbness and pain.  heroin was injected into his R arm (he denies injecting in his legs or feet).  in ED, CPK 33K.  given 2L of isotonic saline.  ROS:  (+) dark urine, L foot numbness, unable to bear weight.  (-) SI.    3/2: Above reviewed; Patient complaining of a lot of pain in his legs; requesting pain meds;   3/3: yesterday nurse found patient to have pin point pupils. he looked high; A friend was there and syringe was found on the floor; Security was called and items were confiscated; patient had to be placed on 1:1; family spoke with staff and requesting a psych evaluation for his severe anxiety  3/4:  Yesterday gf was visiting and staff found a vaporizer in his room;  legs are improved but patient can not move his left foot or wiggle his toes, mother at bedside with patients permission  35:  Patient started walking with the walker; Hes still not able to move his left foot but can move his left Leg; No other issues  3/6:  hes still having difficult time moving the left foot; But overall he has been improving. No overnight events. todays the last day of the methadone taper; patient refusing inpatient detox/rehab  3/7:  last night patient had shooting pain down the left leg. Still not able to move his left foot;  No other complaints     Allergies:  No Known Allergies    REVIEW OF SYSTEMS:  See HPI. All other review of systems is negative unless indicated above.     OBJECTIVE  Physical Exam:  Vital Signs Last 24 Hrs  T(C): 37.1 (07 Mar 2019 11:30), Max: 37.1 (07 Mar 2019 11:30)  T(F): 98.7 (07 Mar 2019 11:30), Max: 98.7 (07 Mar 2019 11:30)  HR: 96 (07 Mar 2019 11:30) (91 - 96)  BP: 105/55 (07 Mar 2019 11:30) (105/55 - 123/61)  BP(mean): --  RR: 16 (07 Mar 2019 11:30) (16 - 18)  SpO2: 100% (07 Mar 2019 11:30) (100% - 100%)    Constitutional: NAD, awake and alert, well-developed  Neurological: AAO x 3, no focal deficits  HEENT: PERRLA, EOMI, MMM  Neck: Soft and supple, No LAD, No JVD  Respiratory: Breath sounds are clear bilaterally, No wheezing, rales or rhonchi  Cardiovascular: S1 and S2, regular rate and rhythm; no Murmurs, gallops or rubs  Gastrointestinal: Bowel Sounds present, soft, nontender, nondistended, no guarding, no rebound tenderness  Back: No CVA tenderness   Extremities: No peripheral edema  Vascular: 2+ peripheral pulses  Musculoskeletal: decrease strength in B/L LE left >right; Unable to lift LLE fully due to pain  Skin: No rashes  Breast: Deferred  Rectal: Deferred    MEDICATIONS  (STANDING):  amLODIPine   Tablet 5 milliGRAM(s) Oral at bedtime  clonazePAM Tablet 1 milliGRAM(s) Oral two times a day  enoxaparin Injectable 40 milliGRAM(s) SubCutaneous every 24 hours  methadone    Tablet 10 milliGRAM(s) Oral daily  nicotine -  14 mG/24Hr(s) Patch 1 patch Transdermal daily  sodium bicarbonate  Infusion 0.134 mEq/kG/Hr (150 mL/Hr) IV Continuous <Continuous>      Lab Results:  CBC  CBC Full  -  ( 06 Mar 2019 07:34 )  WBC Count : 6.15 K/uL  Hemoglobin : 13.9 g/dL  Hematocrit : 39.9 %  Platelet Count - Automated : 250 K/uL  Mean Cell Volume : 88.9 fl  Mean Cell Hemoglobin : 31.0 pg  Mean Cell Hemoglobin Concentration : 34.8 gm/dL  Auto Neutrophil # : x  Auto Lymphocyte # : x  Auto Monocyte # : x  Auto Eosinophil # : x  Auto Basophil # : x  Auto Neutrophil % : x  Auto Lymphocyte % : x  Auto Monocyte % : x  Auto Eosinophil % : x  Auto Basophil % : x    .		Differential:	[] Automated		[] Manual  Chemistry                        13.9   6.15  )-----------( 250      ( 06 Mar 2019 07:34 )             39.9     03-07    142  |  110<H>  |  14  ----------------------------<  106<H>  3.8   |  23  |  0.59    Ca    8.6      07 Mar 2019 06:32  Phos  4.6     -  Mg     2.4     -07        Color: Zara / Appearance: Clear / S.015 / pH: x  Gluc: x / Ketone: Moderate  / Bili: Negative / Urobili: 1 mg/dL   Blood: x / Protein: 100 mg/dL / Nitrite: Positive   Leuk Esterase: Trace / RBC: Negative /HPF / WBC 0-2   Sq Epi: x / Non Sq Epi: Occasional / Bacteria: Occasional      CARDIAC MARKERS ( 02 Mar 2019 07:12 )  x     / x     / 16964 U/L / x     / x      CARDIAC MARKERS ( 01 Mar 2019 20:23 )  x     / x     / [id number] U/L / x     / x      CARDIAC MARKERS ( 01 Mar 2019 15:43 )  x     / x     / 47426 U/L / x     / x      CARDIAC MARKERS ( 01 Mar 2019 09:48 )  x     / x     / 09485 U/L / x     / x        RADIOLOGY/EKG:    < from: Xray Chest 1 View- PORTABLE-Urgent (19 @ 13:33) >    FINDINGS/  IMPRESSION:          The lungs are clear.  No pleural abnormality is seen.      Cardiomediastinal silhouette is intact.      < end of copied text >    < from: MR Lumbar Spine No Cont (19 @ 10:01) >      IMPRESSION: Mild degenerative disc disease.Minimal bulge at L4-5 and   L5-S1 with tiny LEFT foraminal disc herniation at L5-S1. Cauda equina is   intact.Mild edema in the posterior paraspinal lumbar musculature of   uncertain etiology. Minimal free fluid seen within the pelvis.    < end of copied text >    < from: MR Thoracic Spine No Cont (19 @ 10:01) >    IMPRESSION: Mild degenerative disc disease.Minimal bulge at L4-5 and   L5-S1 with tiny LEFT foraminal disc herniation at L5-S1. Cauda equina is   intact.Mild edema in the posterior paraspinal lumbar musculature of   uncertain etiology. Minimal free fluid seen within the pelvis.            < end of copied text >

## 2019-03-07 NOTE — PROGRESS NOTE ADULT - ASSESSMENT
*Rhabdomyolysis after Heroin overdose   -Positive hematuria and neg blood on UA c/w myoglobinuria - higher risk for heme pigment renal  failure   and risk for continued rise in CPK after heroin overdose  -CPK trending down  -S/P IVF with bicarbonate switch to NS  -Monitor CPK,Mg, Phos ,Ca and K Daily  -Monitor Urine Output   -renal consult appreciated     *LE pain, L Foot Drop, numbness and tingling most likely 2ndry to rhabdo   -L foot drop. likely has bilateral compression of the peroneal nerves due to the position in which he was seated when he lost consciousness  -neuro consult appreciated -  May need bilateral ankle foot orthoses.   -MRI spine Noted no evidence of cauda equina compression.- Discussed with Neuro  -Surgery consult appreciated - no evidence of compartment syndrome  -Podiatry consult appreciated - Recommend visit with pedorthotist with script for bilateral ankle foot orthosis as per neuro.  -PT evaluation  -Outpatient EMG studies   -Neurontin and Tramadol for pain    *IV Heroin Overdose   *Hx of Anxiety and Polysusbtance Abuse   -Start COWS protocol with Low dose 5 day methadone Taper; EKG - Normal QTC  -SW + Case Management.    *DVT ppx Lovenox

## 2019-03-08 ENCOUNTER — TRANSCRIPTION ENCOUNTER (OUTPATIENT)
Age: 29
End: 2019-03-08

## 2019-03-08 VITALS
RESPIRATION RATE: 16 BRPM | TEMPERATURE: 98 F | OXYGEN SATURATION: 100 % | SYSTOLIC BLOOD PRESSURE: 107 MMHG | HEART RATE: 78 BPM | DIASTOLIC BLOOD PRESSURE: 49 MMHG

## 2019-03-08 LAB
ANION GAP SERPL CALC-SCNC: 11 MMOL/L — SIGNIFICANT CHANGE UP (ref 5–17)
BUN SERPL-MCNC: 16 MG/DL — SIGNIFICANT CHANGE UP (ref 7–23)
CALCIUM SERPL-MCNC: 8.9 MG/DL — SIGNIFICANT CHANGE UP (ref 8.5–10.1)
CHLORIDE SERPL-SCNC: 106 MMOL/L — SIGNIFICANT CHANGE UP (ref 96–108)
CK SERPL-CCNC: 1373 U/L — HIGH (ref 26–308)
CO2 SERPL-SCNC: 23 MMOL/L — SIGNIFICANT CHANGE UP (ref 22–31)
CREAT SERPL-MCNC: 0.63 MG/DL — SIGNIFICANT CHANGE UP (ref 0.5–1.3)
GLUCOSE SERPL-MCNC: 102 MG/DL — HIGH (ref 70–99)
HCT VFR BLD CALC: 42.7 % — SIGNIFICANT CHANGE UP (ref 39–50)
HGB BLD-MCNC: 14.8 G/DL — SIGNIFICANT CHANGE UP (ref 13–17)
MAGNESIUM SERPL-MCNC: 2.4 MG/DL — SIGNIFICANT CHANGE UP (ref 1.6–2.6)
MCHC RBC-ENTMCNC: 30.7 PG — SIGNIFICANT CHANGE UP (ref 27–34)
MCHC RBC-ENTMCNC: 34.7 GM/DL — SIGNIFICANT CHANGE UP (ref 32–36)
MCV RBC AUTO: 88.6 FL — SIGNIFICANT CHANGE UP (ref 80–100)
NRBC # BLD: 0 /100 WBCS — SIGNIFICANT CHANGE UP (ref 0–0)
PHOSPHATE SERPL-MCNC: 4.7 MG/DL — HIGH (ref 2.5–4.5)
PLATELET # BLD AUTO: 330 K/UL — SIGNIFICANT CHANGE UP (ref 150–400)
POTASSIUM SERPL-MCNC: 3.9 MMOL/L — SIGNIFICANT CHANGE UP (ref 3.5–5.3)
POTASSIUM SERPL-SCNC: 3.9 MMOL/L — SIGNIFICANT CHANGE UP (ref 3.5–5.3)
RBC # BLD: 4.82 M/UL — SIGNIFICANT CHANGE UP (ref 4.2–5.8)
RBC # FLD: 12 % — SIGNIFICANT CHANGE UP (ref 10.3–14.5)
SODIUM SERPL-SCNC: 140 MMOL/L — SIGNIFICANT CHANGE UP (ref 135–145)
WBC # BLD: 8.7 K/UL — SIGNIFICANT CHANGE UP (ref 3.8–10.5)
WBC # FLD AUTO: 8.7 K/UL — SIGNIFICANT CHANGE UP (ref 3.8–10.5)

## 2019-03-08 RX ORDER — NICOTINE POLACRILEX 2 MG
1 GUM BUCCAL
Qty: 30 | Refills: 0 | OUTPATIENT
Start: 2019-03-08 | End: 2019-04-06

## 2019-03-08 RX ORDER — GABAPENTIN 400 MG/1
1 CAPSULE ORAL
Qty: 60 | Refills: 0 | OUTPATIENT
Start: 2019-03-08 | End: 2019-04-06

## 2019-03-08 RX ORDER — TRAMADOL HYDROCHLORIDE 50 MG/1
1 TABLET ORAL
Qty: 28 | Refills: 0 | OUTPATIENT
Start: 2019-03-08 | End: 2019-03-14

## 2019-03-08 RX ADMIN — Medication 1 PATCH: at 06:16

## 2019-03-08 RX ADMIN — Medication 1 PATCH: at 09:44

## 2019-03-08 RX ADMIN — Medication 1 MILLIGRAM(S): at 06:17

## 2019-03-08 RX ADMIN — GABAPENTIN 300 MILLIGRAM(S): 400 CAPSULE ORAL at 06:17

## 2019-03-08 NOTE — DISCHARGE NOTE ADULT - PLAN OF CARE
Rhabdomyolysis after Heroin overdose Positive hematuria and neg blood on UA c/w myoglobinuria  CPK levels >33k now 1300  -monitor CPK as outpatient LE pain, L Foot Drop, numbness and tingling most likely 2ndry to rhabdo  and nerve compression -L foot drop. likely has bilateral compression of the peroneal nerves due to the position in which he was seated when he lost consciousness  -D/C on bilateral ankle foot orthoses.   -MRI spine Noted no evidence of cauda equina compression.  -FU with Neuro and pedorthotist   -Outpatient EMG studies   -Neurontin and Tramadol for pain IV Heroin/Polysubstance abuse S/P Methadone tape with no signs of withdrawal; FU with Psych as outpaitent

## 2019-03-08 NOTE — DISCHARGE NOTE ADULT - HOSPITAL COURSE
HOSPITALIST PROGRESS NOTE:  SUBJECTIVE:  PCP: Dr. Arriaga    Chief Complaint: Patient is a 28y old  Male who presents with a chief complaint of Patient is a 28y old  Male who presents with a chief complaint of heroin overdose. (01 Mar 2019 21:17)    HPI:  28M.  admitted 03/01/19.  presented to ED after heroin overdose.  was in his room last night and passed out sitting on the floor.  he woke up in the morning w/ numbness and tingling in his legs.  was unable to ambulate.  continues to c/o b/l foot numbness and pain.  heroin was injected into his R arm (he denies injecting in his legs or feet).  in ED, CPK 33K.  given 2L of isotonic saline.  ROS:  (+) dark urine, L foot numbness, unable to bear weight.  (-) SI.    3/2: Above reviewed; Patient complaining of a lot of pain in his legs; requesting pain meds;   3/3: yesterday nurse found patient to have pin point pupils. he looked high; A friend was there and syringe was found on the floor; Security was called and items were confiscated; patient had to be placed on 1:1; family spoke with staff and requesting a psych evaluation for his severe anxiety  3/4:  Yesterday gf was visiting and staff found a vaporizer in his room;  legs are improved but patient can not move his left foot or wiggle his toes, mother at bedside with patients permission  3/5:  Patient started walking with the walker; Hes still not able to move his left foot but can move his left Leg; No other issues  3/6:  hes still having difficult time moving the left foot; But overall he has been improving. No overnight events. todays the last day of the methadone taper; patient refusing inpatient detox/rehab  3/7:  last night patient had shooting pain down the left leg. Still not able to move his left foot;  No other complaints   3/8;  Patient has no complaints. wants to leave; No overnight events     Rhabdomyolysis after Heroin overdose   -Positive hematuria and neg blood on UA c/w myoglobinuria - higher risk for heme pigment renal  failure and risk for continued rise in CPK after heroin overdose  -CPK trending down  -S/P IVF with bicarbonate switch to NS  -Monitor CPK,Mg, Phos ,Ca and K Daily  -Monitor Urine Output   -renal consult appreciated     *LE pain, L Foot Drop, numbness and tingling most likely 2ndry to rhabdo   -L foot drop. likely has bilateral compression of the peroneal nerves due to the position in which he was seated when he lost consciousness  -neuro consult appreciated -  May need bilateral ankle foot orthoses.   -MRI spine Noted no evidence of cauda equina compression.- Discussed with Neuro  -Surgery consult appreciated - no evidence of compartment syndrome  -Podiatry consult appreciated - Recommend visit with pedorthotist with script for bilateral ankle foot orthosis as per neuro.  -PT evaluation  -Outpatient EMG studies   -Neurontin and Tramadol for pain    *IV Heroin Overdose   *Hx of Anxiety and Polysusbtance Abuse   -Start COWS protocol with Low dose 5 day methadone Taper; EKG - Normal QTC - completed   -SW + Case Management.    *DVT ppx Lovenox    total time: 38 minutes

## 2019-03-08 NOTE — DISCHARGE NOTE ADULT - PATIENT PORTAL LINK FT
You can access the [facility] Portal, offered by [facility], by registering with the following website: [email or web address]

## 2019-03-08 NOTE — PROGRESS NOTE ADULT - SUBJECTIVE AND OBJECTIVE BOX
HOSPITALIST PROGRESS NOTE:  SUBJECTIVE:  PCP: Dr. Arriaga    Chief Complaint: Patient is a 28y old  Male who presents with a chief complaint of Patient is a 28y old  Male who presents with a chief complaint of heroin overdose. (01 Mar 2019 21:17)    HPI:  28M.  admitted 19.  presented to ED after heroin overdose.  was in his room last night and passed out sitting on the floor.  he woke up in the morning w/ numbness and tingling in his legs.  was unable to ambulate.  continues to c/o b/l foot numbness and pain.  heroin was injected into his R arm (he denies injecting in his legs or feet).  in ED, CPK 33K.  given 2L of isotonic saline.  ROS:  (+) dark urine, L foot numbness, unable to bear weight.  (-) SI.    3/2: Above reviewed; Patient complaining of a lot of pain in his legs; requesting pain meds;   3/3: yesterday nurse found patient to have pin point pupils. he looked high; A friend was there and syringe was found on the floor; Security was called and items were confiscated; patient had to be placed on 1:1; family spoke with staff and requesting a psych evaluation for his severe anxiety  3/4:  Yesterday gf was visiting and staff found a vaporizer in his room;  legs are improved but patient can not move his left foot or wiggle his toes, mother at bedside with patients permission  35:  Patient started walking with the walker; Hes still not able to move his left foot but can move his left Leg; No other issues  36:  hes still having difficult time moving the left foot; But overall he has been improving. No overnight events. todays the last day of the methadone taper; patient refusing inpatient detox/rehab  3/7:  last night patient had shooting pain down the left leg. Still not able to move his left foot;  No other complaints   3/8;  Patient has no complaints. wants to leave; No overnight events     Allergies:  No Known Allergies    REVIEW OF SYSTEMS:  See HPI. All other review of systems is negative unless indicated above.     OBJECTIVE  Physical Exam:  Vital Signs Last 24 Hrs  T(C): 36.8 (08 Mar 2019 05:21), Max: 37.1 (07 Mar 2019 11:30)  T(F): 98.3 (08 Mar 2019 05:21), Max: 98.7 (07 Mar 2019 11:30)  HR: 78 (08 Mar 2019 05:21) (78 - 96)  BP: 107/49 (08 Mar 2019 05:21) (105/55 - 107/49)  BP(mean): --  RR: 16 (08 Mar 2019 05:21) (16 - 16)  SpO2: 100% (08 Mar 2019 05:21) (100% - 100%)    Constitutional: NAD, awake and alert, well-developed  Neurological: AAO x 3, no focal deficits  HEENT: PERRLA, EOMI, MMM  Neck: Soft and supple, No LAD, No JVD  Respiratory: Breath sounds are clear bilaterally, No wheezing, rales or rhonchi  Cardiovascular: S1 and S2, regular rate and rhythm; no Murmurs, gallops or rubs  Gastrointestinal: Bowel Sounds present, soft, nontender, nondistended, no guarding, no rebound tenderness  Back: No CVA tenderness   Extremities: No peripheral edema  Vascular: 2+ peripheral pulses  Musculoskeletal: decrease strength in B/L LE left >right; Unable to lift LLE fully due to pain  Skin: No rashes  Breast: Deferred  Rectal: Deferred    MEDICATIONS  (STANDING):  amLODIPine   Tablet 5 milliGRAM(s) Oral at bedtime  clonazePAM Tablet 1 milliGRAM(s) Oral two times a day  enoxaparin Injectable 40 milliGRAM(s) SubCutaneous every 24 hours  methadone    Tablet 10 milliGRAM(s) Oral daily  nicotine -  14 mG/24Hr(s) Patch 1 patch Transdermal daily  sodium bicarbonate  Infusion 0.134 mEq/kG/Hr (150 mL/Hr) IV Continuous <Continuous>    Lab Results:  CBC  CBC Full  -  ( 08 Mar 2019 06:33 )  WBC Count : 8.70 K/uL  Hemoglobin : 14.8 g/dL  Hematocrit : 42.7 %  Platelet Count - Automated : 330 K/uL  Mean Cell Volume : 88.6 fl  Mean Cell Hemoglobin : 30.7 pg  Mean Cell Hemoglobin Concentration : 34.7 gm/dL  Auto Neutrophil # : x  Auto Lymphocyte # : x  Auto Monocyte # : x  Auto Eosinophil # : x  Auto Basophil # : x  Auto Neutrophil % : x  Auto Lymphocyte % : x  Auto Monocyte % : x  Auto Eosinophil % : x  Auto Basophil % : x    .		Differential:	[] Automated		[] Manual  Chemistry                        14.8   8.70  )-----------( 330      ( 08 Mar 2019 06:33 )             42.7     03-08    140  |  106  |  16  ----------------------------<  102<H>  3.9   |  23  |  0.63    Ca    8.9      08 Mar 2019 06:33  Phos  4.7     03-08  Mg     2.4     03-08      Color: Zara / Appearance: Clear / S.015 / pH: x  Gluc: x / Ketone: Moderate  / Bili: Negative / Urobili: 1 mg/dL   Blood: x / Protein: 100 mg/dL / Nitrite: Positive   Leuk Esterase: Trace / RBC: Negative /HPF / WBC 0-2   Sq Epi: x / Non Sq Epi: Occasional / Bacteria: Occasional      CARDIAC MARKERS ( 02 Mar 2019 07:12 )  x     / x     / 29469 U/L / x     / x      CARDIAC MARKERS ( 01 Mar 2019 20:23 )  x     / x     / 16602 U/L / x     / x      CARDIAC MARKERS ( 01 Mar 2019 15:43 )  x     / x     / 97263 U/L / x     / x      CARDIAC MARKERS ( 01 Mar 2019 09:48 )  x     / x     / 30921 U/L / x     / x        RADIOLOGY/EKG:    < from: Xray Chest 1 View- PORTABLE-Urgent (19 @ 13:33) >    FINDINGS/  IMPRESSION:          The lungs are clear.  No pleural abnormality is seen.      Cardiomediastinal silhouette is intact.      < end of copied text >    < from: MR Lumbar Spine No Cont (19 @ 10:01) >      IMPRESSION: Mild degenerative disc disease.Minimal bulge at L4-5 and   L5-S1 with tiny LEFT foraminal disc herniation at L5-S1. Cauda equina is   intact.Mild edema in the posterior paraspinal lumbar musculature of   uncertain etiology. Minimal free fluid seen within the pelvis.    < end of copied text >    < from: MR Thoracic Spine No Cont (19 @ 10:01) >    IMPRESSION: Mild degenerative disc disease.Minimal bulge at L4-5 and   L5-S1 with tiny LEFT foraminal disc herniation at L5-S1. Cauda equina is   intact.Mild edema in the posterior paraspinal lumbar musculature of   uncertain etiology. Minimal free fluid seen within the pelvis.            < end of copied text >

## 2019-03-08 NOTE — DISCHARGE NOTE ADULT - OTHER SIGNIFICANT FINDINGS
RADIOLOGY/EKG:    < from: Xray Chest 1 View- PORTABLE-Urgent (03.01.19 @ 13:33) >    FINDINGS/  IMPRESSION:          The lungs are clear.  No pleural abnormality is seen.      Cardiomediastinal silhouette is intact.      < end of copied text >    < from: MR Lumbar Spine No Cont (03.04.19 @ 10:01) >      IMPRESSION: Mild degenerative disc disease.Minimal bulge at L4-5 and   L5-S1 with tiny LEFT foraminal disc herniation at L5-S1. Cauda equina is   intact.Mild edema in the posterior paraspinal lumbar musculature of   uncertain etiology. Minimal free fluid seen within the pelvis.    < end of copied text >    < from: MR Thoracic Spine No Cont (03.04.19 @ 10:01) >    IMPRESSION: Mild degenerative disc disease.Minimal bulge at L4-5 and   L5-S1 with tiny LEFT foraminal disc herniation at L5-S1. Cauda equina is   intact.Mild edema in the posterior paraspinal lumbar musculature of   uncertain etiology. Minimal free fluid seen within the pelvis.            < end of copied text >

## 2019-03-08 NOTE — DISCHARGE NOTE ADULT - CARE PROVIDER_API CALL
MACARENA sharpe PCP,   Phone: (   )    -  Fax: (   )    -  Follow Up Time:     Christina Joseph (MD)  Clinical Neurophysiology; EEGEpilepsy; Neurology; Sleep Medicine  [address] [place] [address], [place] [address]  Phone: [phone]  Fax: [phone]  Follow Up Time:

## 2019-03-08 NOTE — DISCHARGE NOTE ADULT - MEDICATION SUMMARY - MEDICATIONS TO TAKE
I will START or STAY ON the medications listed below when I get home from the hospital:    traMADol 50 mg oral tablet  -- 1 tab(s) by mouth every 6 hours, As needed, Moderate Pain (4 - 6) MDD:4 tabs  -- Indication: For Pain    gabapentin 300 mg oral capsule  -- 1 cap(s) by mouth every 12 hours  -- Indication: For Neuropathy pain    clonazePAM 1 mg oral tablet  -- 1 tab(s) by mouth 2 times a day  -- Indication: For anxiety    nicotine 14 mg/24 hr transdermal film, extended release  -- 1 patch by transdermal patch once a day   -- Indication: For smoking cessation

## 2019-03-08 NOTE — PROGRESS NOTE ADULT - PROVIDER SPECIALTY LIST ADULT
Hospitalist
Nephrology
Neurology
Surgery
Surgery
Trauma Surgery
Trauma Surgery
Podiatry

## 2019-03-08 NOTE — DISCHARGE NOTE ADULT - CARE PLAN
Principal Discharge DX:	Non-traumatic rhabdomyolysis  Goal:	Rhabdomyolysis after Heroin overdose  Assessment and plan of treatment:	Positive hematuria and neg blood on UA c/w myoglobinuria  CPK levels >33k now 1300  -monitor CPK as outpatient  Goal:	LE pain, L Foot Drop, numbness and tingling most likely 2ndry to rhabdo  and nerve compression  Assessment and plan of treatment:	-L foot drop. likely has bilateral compression of the peroneal nerves due to the position in which he was seated when he lost consciousness  -D/C on bilateral ankle foot orthoses.   -MRI spine Noted no evidence of cauda equina compression.  -FU with Neuro and pedorthotist   -Outpatient EMG studies   -Neurontin and Tramadol for pain  Goal:	IV Heroin/Polysubstance abuse  Assessment and plan of treatment:	S/P Methadone tape with no signs of withdrawal; FU with Psych as outpaitent

## 2019-03-08 NOTE — DISCHARGE NOTE ADULT - PROVIDER TOKENS
FREE:[LAST:[[facility] sharpe PCP],PHONE:[(   )    -],FAX:[(   )    -]],PROVIDER:[TOKEN:[[id number]]]

## 2019-03-08 NOTE — PROGRESS NOTE ADULT - NSHPATTENDINGPLANDISCUSS_GEN_ALL_CORE
Patient, nurse
REYMUNDO Christensen DPM

## 2019-03-08 NOTE — PROGRESS NOTE ADULT - ASSESSMENT
*Rhabdomyolysis after Heroin overdose   -Positive hematuria and neg blood on UA c/w myoglobinuria - higher risk for heme pigment renal  failure and risk for continued rise in CPK after heroin overdose  -CPK trending down  -S/P IVF with bicarbonate switch to NS  -Monitor CPK,Mg, Phos ,Ca and K Daily  -Monitor Urine Output   -renal consult appreciated     *LE pain, L Foot Drop, numbness and tingling most likely 2ndry to rhabdo   -L foot drop. likely has bilateral compression of the peroneal nerves due to the position in which he was seated when he lost consciousness  -neuro consult appreciated -  May need bilateral ankle foot orthoses.   -MRI spine Noted no evidence of cauda equina compression.- Discussed with Neuro  -Surgery consult appreciated - no evidence of compartment syndrome  -Podiatry consult appreciated - Recommend visit with pedorthotist with script for bilateral ankle foot orthosis as per neuro.  -PT evaluation  -Outpatient EMG studies   -Neurontin and Tramadol for pain    *IV Heroin Overdose   *Hx of Anxiety and Polysusbtance Abuse   -Start COWS protocol with Low dose 5 day methadone Taper; EKG - Normal QTC - completed   -SW + Case Management.    *DVT ppx Lovenox

## 2019-03-08 NOTE — PROGRESS NOTE ADULT - REASON FOR ADMISSION
Patient is a 28y old  Male who presents with a chief complaint of heroin overdose.

## 2019-03-12 DIAGNOSIS — M21.372 FOOT DROP, LEFT FOOT: ICD-10-CM

## 2019-03-12 DIAGNOSIS — M62.82 RHABDOMYOLYSIS: ICD-10-CM

## 2019-03-12 DIAGNOSIS — T40.1X4A POISONING BY HEROIN, UNDETERMINED, INITIAL ENCOUNTER: ICD-10-CM

## 2019-03-12 DIAGNOSIS — R82.1 MYOGLOBINURIA: ICD-10-CM

## 2019-03-12 DIAGNOSIS — F19.10 OTHER PSYCHOACTIVE SUBSTANCE ABUSE, UNCOMPLICATED: ICD-10-CM

## 2019-03-12 DIAGNOSIS — F11.20 OPIOID DEPENDENCE, UNCOMPLICATED: ICD-10-CM

## 2019-03-12 DIAGNOSIS — F41.9 ANXIETY DISORDER, UNSPECIFIED: ICD-10-CM

## 2019-03-12 DIAGNOSIS — R31.9 HEMATURIA, UNSPECIFIED: ICD-10-CM

## 2019-05-13 ENCOUNTER — EMERGENCY (EMERGENCY)
Facility: HOSPITAL | Age: 29
LOS: 0 days | Discharge: HOME | End: 2019-05-13
Attending: STUDENT IN AN ORGANIZED HEALTH CARE EDUCATION/TRAINING PROGRAM | Admitting: STUDENT IN AN ORGANIZED HEALTH CARE EDUCATION/TRAINING PROGRAM
Payer: COMMERCIAL

## 2019-05-13 VITALS
WEIGHT: 175.05 LBS | HEART RATE: 111 BPM | SYSTOLIC BLOOD PRESSURE: 145 MMHG | TEMPERATURE: 98 F | OXYGEN SATURATION: 98 % | DIASTOLIC BLOOD PRESSURE: 73 MMHG | RESPIRATION RATE: 20 BRPM | HEIGHT: 70 IN

## 2019-05-13 VITALS
HEART RATE: 99 BPM | SYSTOLIC BLOOD PRESSURE: 113 MMHG | TEMPERATURE: 98 F | OXYGEN SATURATION: 99 % | RESPIRATION RATE: 18 BRPM | DIASTOLIC BLOOD PRESSURE: 61 MMHG

## 2019-05-13 DIAGNOSIS — M79.661 PAIN IN RIGHT LOWER LEG: ICD-10-CM
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
## 2019-05-13 PROCEDURE — 99283 EMERGENCY DEPT VISIT LOW MDM: CPT | Mod: 25

## 2019-05-13 RX ORDER — IBUPROFEN 200 MG
600 TABLET ORAL ONCE
Refills: 0 | Status: DISCONTINUED | OUTPATIENT
Start: 2019-05-13 | End: 2019-05-13

## 2019-05-13 RX ORDER — KETOROLAC TROMETHAMINE 30 MG/ML
30 SYRINGE (ML) INJECTION ONCE
Refills: 0 | Status: DISCONTINUED | OUTPATIENT
Start: 2019-05-13 | End: 2019-05-13

## 2019-05-13 RX ADMIN — Medication 30 MILLIGRAM(S): at 19:35
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
## 2019-05-13 NOTE — ED PROVIDER NOTE - CLINICAL SUMMARY MEDICAL DECISION MAKING FREE TEXT BOX
27 y/o male with right leg pain. Pt offered x-rays, pt refused, pt offered Toradol IM, pt refused. Will give Motrin PO, pt requesting to go home at this time.

## 2019-05-13 NOTE — ED PROVIDER NOTE - PHYSICAL EXAMINATION
GEN: AOX3, NAD. HEENT: Throat clear. Head NC/AT. NECK: Supple, No JVD. FROM. C-spine non-tender. CV:S1S2, RRR, LUNGS: CTA b/l, no w/r/r. ABD: Soft, NT/ND, no rebound, no guarding. No CVAT. EXT: No e/c/c. 2+ distal pulses. RLE: +Mild muscular tenderness right lateral thigh area. FROM with mild pain. No bony deformity. NVI. 2+ distal pulses. Able to straight raise RLE without difficulty. SKIN: No rashes. NEURO: No focal deficits. CN II-XII intact. FROM. 5/5 motor and sensory. MARYANN Wisdom

## 2019-05-13 NOTE — ED PROVIDER NOTE - PROGRESS NOTE DETAILS
patient refused xrays, feels fine, just wanted to get checked out. will dc home. ED evaluation and management discussed with the patient and family (if available) in detail.  Close PMD follow up encouraged.  Strict ED return instructions discussed in detail and patient given the opportunity to ask any questions about their discharge diagnosis and instructions. Patient verbalized understanding. MARYANN Wisdom Sarah DO: Now requesting toradol; given at this time; repeat vitals prior to d/c. strict return precautions given.

## 2019-05-13 NOTE — ED PROVIDER NOTE - CARE PROVIDER_API CALL
Tommy Geronimo)  Orthopaedic Surgery  [address] [place] [address]  Phone: [phone]  Fax: [phone]  Follow Up Time:

## 2019-05-13 NOTE — ED PROVIDER NOTE - ATTENDING CONTRIBUTION TO CARE
I, Hope Smith DO,  performed the initial face to face bedside interview with this patient regarding history of present illness, review of symptoms and relevant past medical, social and family history.  I completed an independent physical examination.  I was the initial provider who evaluated this patient. I have signed out the follow up of any pending tests (i.e. labs, radiological studies) to the ACP.  I have communicated the patient’s plan of care and disposition with the ACP.  The history, relevant review of systems, past medical and surgical history, medical decision making, and physical examination was documented by the scribe in my presence and I attest to the accuracy of the documentation.

## 2019-05-13 NOTE — ED ADULT NURSE NOTE - NSIMPLEMENTINTERV_GEN_ALL_ED
Implemented All Fall Risk Interventions:  Elizabeth to call system. Call bell, personal items and telephone within reach. Instruct patient to call for assistance. Room bathroom lighting operational. Non-slip footwear when patient is off stretcher. Physically safe environment: no spills, clutter or unnecessary equipment. Stretcher in lowest position, wheels locked, appropriate side rails in place. Provide visual cue, wrist band, yellow gown, etc. Monitor gait and stability. Monitor for mental status changes and reorient to person, place, and time. Review medications for side effects contributing to fall risk. Reinforce activity limits and safety measures with patient and family.
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
## 2019-05-14 PROBLEM — F19.10 OTHER PSYCHOACTIVE SUBSTANCE ABUSE, UNCOMPLICATED: Chronic | Status: ACTIVE | Noted: 2019-03-02

## 2019-06-11 ENCOUNTER — APPOINTMENT (OUTPATIENT)
Dept: INTERNAL MEDICINE | Facility: CLINIC | Age: 29
End: 2019-06-11

## 2019-08-19 ENCOUNTER — EMERGENCY (EMERGENCY)
Facility: HOSPITAL | Age: 29
LOS: 0 days | Discharge: HOME | End: 2019-08-19
Attending: EMERGENCY MEDICINE
Payer: MEDICAID

## 2019-08-19 VITALS
TEMPERATURE: 97 F | DIASTOLIC BLOOD PRESSURE: 50 MMHG | SYSTOLIC BLOOD PRESSURE: 99 MMHG | HEART RATE: 67 BPM | RESPIRATION RATE: 19 BRPM | OXYGEN SATURATION: 99 %

## 2019-08-19 VITALS — HEIGHT: 69 IN | WEIGHT: 175.05 LBS

## 2019-08-19 DIAGNOSIS — Z86.19 PERSONAL HISTORY OF OTHER INFECTIOUS AND PARASITIC DISEASES: ICD-10-CM

## 2019-08-19 DIAGNOSIS — G89.29 OTHER CHRONIC PAIN: ICD-10-CM

## 2019-08-19 DIAGNOSIS — R82.998 OTHER ABNORMAL FINDINGS IN URINE: ICD-10-CM

## 2019-08-19 DIAGNOSIS — R11.0 NAUSEA: ICD-10-CM

## 2019-08-19 DIAGNOSIS — M79.661 PAIN IN RIGHT LOWER LEG: ICD-10-CM

## 2019-08-19 DIAGNOSIS — R20.2 PARESTHESIA OF SKIN: ICD-10-CM

## 2019-08-19 LAB
ALBUMIN SERPL ELPH-MCNC: 3.8 G/DL — SIGNIFICANT CHANGE UP (ref 3.3–5)
ALP SERPL-CCNC: 90 U/L — SIGNIFICANT CHANGE UP (ref 40–120)
ALT FLD-CCNC: 41 U/L — SIGNIFICANT CHANGE UP (ref 12–78)
ANION GAP SERPL CALC-SCNC: 6 MMOL/L — SIGNIFICANT CHANGE UP (ref 5–17)
APPEARANCE UR: CLEAR — SIGNIFICANT CHANGE UP
AST SERPL-CCNC: 26 U/L — SIGNIFICANT CHANGE UP (ref 15–37)
BASOPHILS # BLD AUTO: 0.04 K/UL — SIGNIFICANT CHANGE UP (ref 0–0.2)
BASOPHILS NFR BLD AUTO: 0.5 % — SIGNIFICANT CHANGE UP (ref 0–2)
BILIRUB SERPL-MCNC: 0.4 MG/DL — SIGNIFICANT CHANGE UP (ref 0.2–1.2)
BILIRUB UR-MCNC: NEGATIVE — SIGNIFICANT CHANGE UP
BUN SERPL-MCNC: 13 MG/DL — SIGNIFICANT CHANGE UP (ref 7–23)
CALCIUM SERPL-MCNC: 9.5 MG/DL — SIGNIFICANT CHANGE UP (ref 8.5–10.1)
CHLORIDE SERPL-SCNC: 107 MMOL/L — SIGNIFICANT CHANGE UP (ref 96–108)
CK SERPL-CCNC: 109 U/L — SIGNIFICANT CHANGE UP (ref 26–308)
CO2 SERPL-SCNC: 24 MMOL/L — SIGNIFICANT CHANGE UP (ref 22–31)
COLOR SPEC: YELLOW — SIGNIFICANT CHANGE UP
CREAT SERPL-MCNC: 0.79 MG/DL — SIGNIFICANT CHANGE UP (ref 0.5–1.3)
DIFF PNL FLD: ABNORMAL
EOSINOPHIL # BLD AUTO: 0.1 K/UL — SIGNIFICANT CHANGE UP (ref 0–0.5)
EOSINOPHIL NFR BLD AUTO: 1.2 % — SIGNIFICANT CHANGE UP (ref 0–6)
GLUCOSE SERPL-MCNC: 122 MG/DL — HIGH (ref 70–99)
GLUCOSE UR QL: NEGATIVE MG/DL — SIGNIFICANT CHANGE UP
HCT VFR BLD CALC: 45.1 % — SIGNIFICANT CHANGE UP (ref 39–50)
HGB BLD-MCNC: 15.5 G/DL — SIGNIFICANT CHANGE UP (ref 13–17)
HIV 1 & 2 AB SERPL IA.RAPID: SIGNIFICANT CHANGE UP
IMM GRANULOCYTES NFR BLD AUTO: 0.1 % — SIGNIFICANT CHANGE UP (ref 0–1.5)
KETONES UR-MCNC: NEGATIVE — SIGNIFICANT CHANGE UP
LEUKOCYTE ESTERASE UR-ACNC: ABNORMAL
LYMPHOCYTES # BLD AUTO: 1.12 K/UL — SIGNIFICANT CHANGE UP (ref 1–3.3)
LYMPHOCYTES # BLD AUTO: 13.6 % — SIGNIFICANT CHANGE UP (ref 13–44)
MCHC RBC-ENTMCNC: 30.4 PG — SIGNIFICANT CHANGE UP (ref 27–34)
MCHC RBC-ENTMCNC: 34.4 GM/DL — SIGNIFICANT CHANGE UP (ref 32–36)
MCV RBC AUTO: 88.4 FL — SIGNIFICANT CHANGE UP (ref 80–100)
MONOCYTES # BLD AUTO: 0.51 K/UL — SIGNIFICANT CHANGE UP (ref 0–0.9)
MONOCYTES NFR BLD AUTO: 6.2 % — SIGNIFICANT CHANGE UP (ref 2–14)
NEUTROPHILS # BLD AUTO: 6.46 K/UL — SIGNIFICANT CHANGE UP (ref 1.8–7.4)
NEUTROPHILS NFR BLD AUTO: 78.4 % — HIGH (ref 43–77)
NITRITE UR-MCNC: NEGATIVE — SIGNIFICANT CHANGE UP
PH UR: 7 — SIGNIFICANT CHANGE UP (ref 5–8)
PLATELET # BLD AUTO: 162 K/UL — SIGNIFICANT CHANGE UP (ref 150–400)
POTASSIUM SERPL-MCNC: 4.2 MMOL/L — SIGNIFICANT CHANGE UP (ref 3.5–5.3)
POTASSIUM SERPL-SCNC: 4.2 MMOL/L — SIGNIFICANT CHANGE UP (ref 3.5–5.3)
PROT SERPL-MCNC: 8.5 GM/DL — HIGH (ref 6–8.3)
PROT UR-MCNC: 15 MG/DL
RBC # BLD: 5.1 M/UL — SIGNIFICANT CHANGE UP (ref 4.2–5.8)
RBC # FLD: 12 % — SIGNIFICANT CHANGE UP (ref 10.3–14.5)
SODIUM SERPL-SCNC: 137 MMOL/L — SIGNIFICANT CHANGE UP (ref 135–145)
SP GR SPEC: 1.01 — SIGNIFICANT CHANGE UP (ref 1.01–1.02)
UROBILINOGEN FLD QL: 1 MG/DL
WBC # BLD: 8.24 K/UL — SIGNIFICANT CHANGE UP (ref 3.8–10.5)
WBC # FLD AUTO: 8.24 K/UL — SIGNIFICANT CHANGE UP (ref 3.8–10.5)

## 2019-08-19 PROCEDURE — 36415 COLL VENOUS BLD VENIPUNCTURE: CPT

## 2019-08-19 PROCEDURE — 99284 EMERGENCY DEPT VISIT MOD MDM: CPT | Mod: 25

## 2019-08-19 PROCEDURE — 72158 MRI LUMBAR SPINE W/O & W/DYE: CPT | Mod: 26

## 2019-08-19 PROCEDURE — 81001 URINALYSIS AUTO W/SCOPE: CPT

## 2019-08-19 PROCEDURE — A9579: CPT

## 2019-08-19 PROCEDURE — 82550 ASSAY OF CK (CPK): CPT

## 2019-08-19 PROCEDURE — 86703 HIV-1/HIV-2 1 RESULT ANTBDY: CPT

## 2019-08-19 PROCEDURE — 96361 HYDRATE IV INFUSION ADD-ON: CPT

## 2019-08-19 PROCEDURE — 80053 COMPREHEN METABOLIC PANEL: CPT

## 2019-08-19 PROCEDURE — 96360 HYDRATION IV INFUSION INIT: CPT

## 2019-08-19 PROCEDURE — 93971 EXTREMITY STUDY: CPT | Mod: RT

## 2019-08-19 PROCEDURE — 85025 COMPLETE CBC W/AUTO DIFF WBC: CPT

## 2019-08-19 PROCEDURE — 93971 EXTREMITY STUDY: CPT | Mod: 26,RT

## 2019-08-19 PROCEDURE — 72158 MRI LUMBAR SPINE W/O & W/DYE: CPT

## 2019-08-19 PROCEDURE — 99284 EMERGENCY DEPT VISIT MOD MDM: CPT

## 2019-08-19 RX ORDER — SODIUM CHLORIDE 9 MG/ML
1000 INJECTION INTRAMUSCULAR; INTRAVENOUS; SUBCUTANEOUS ONCE
Refills: 0 | Status: COMPLETED | OUTPATIENT
Start: 2019-08-19 | End: 2019-08-19

## 2019-08-19 RX ORDER — ONDANSETRON 8 MG/1
4 TABLET, FILM COATED ORAL ONCE
Refills: 0 | Status: COMPLETED | OUTPATIENT
Start: 2019-08-19 | End: 2019-08-19

## 2019-08-19 RX ORDER — ONDANSETRON 8 MG/1
1 TABLET, FILM COATED ORAL
Qty: 10 | Refills: 0
Start: 2019-08-19

## 2019-08-19 RX ADMIN — Medication 1 MILLIGRAM(S): at 14:29

## 2019-08-19 RX ADMIN — ONDANSETRON 4 MILLIGRAM(S): 8 TABLET, FILM COATED ORAL at 14:29

## 2019-08-19 RX ADMIN — SODIUM CHLORIDE 1000 MILLILITER(S): 9 INJECTION INTRAMUSCULAR; INTRAVENOUS; SUBCUTANEOUS at 18:15

## 2019-08-19 RX ADMIN — SODIUM CHLORIDE 1000 MILLILITER(S): 9 INJECTION INTRAMUSCULAR; INTRAVENOUS; SUBCUTANEOUS at 15:47

## 2019-08-19 NOTE — ED STATDOCS - CARE PLAN
Principal Discharge DX:	Paresthesia  Secondary Diagnosis:	Chronic pain Principal Discharge DX:	Paresthesia  Secondary Diagnosis:	Chronic pain  Secondary Diagnosis:	Leg pain

## 2019-08-19 NOTE — SBIRT NOTE ADULT - NSSBIRTDRGPOSREINDET_GEN_A_CORE
Provided SBIRT services: Positive reinforcement provided given patient currently within healthy guidelines. Education materials reviewed and given to patient.    Offered information, patient signed consent for: [employer], case management service.

## 2019-08-19 NOTE — ED STATDOCS - CLINICAL SUMMARY MEDICAL DECISION MAKING FREE TEXT BOX
Patient with hx of IVDA and rhabdo, now has new onset RLE pain and tingling.  Labs did not show rhabdo and he was still symptomatic, MRI ordered to rule out spinal abscess, this was not found.  He is to follow up with his specialists as scheduled.  Return precautions reviewed.
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
## 2019-08-19 NOTE — ED ADULT TRIAGE NOTE - CHIEF COMPLAINT QUOTE
patient complains of dark urine and right leg pain with numbness and tingling. patient reports he had rhabdomyolysis in March. color good. skin warm and dry. respirations even and unlabored.

## 2019-08-19 NOTE — ED STATDOCS - PROGRESS NOTE DETAILS
Patient initially seen and evaluated with ED attending at intake.  Patient to get labs to rule out rhabdo as he has similar symptoms to his previous episode.  As patient has a hx of IVDA, the nursing team is having a difficult time to get the IV and blood work.  IV team aware but states they will be a while.  PO medications ordered as patient is very anxious.  bloodwork and IV pending -Carrie Briceno PA-C As labs were not showing signs of rhabdo and he is having new neurologic symptoms and pain to LE with a recent history of IVDA, MRI of lumbar spine was ordered to rule out a spinal abscess.  No acute findings on MRI.  Reviewed all findings with patient and patient mother at bedside.  He has follow up with GI for his N/V and neuro for his pain.  He is also getting PT.  Will d/c with rx for zofran as he has been able to comfortably tolerate PO s/p zofran here in the department.  Return precautions reviewed -Carrie Briceno PA-C

## 2019-08-19 NOTE — ED STATDOCS - PHYSICAL EXAMINATION
GEN - NAD; well appearing; A+O x3   HEAD - NC/AT     EYES - EOMI, no conjunctival pallor, no scleral icterus  ENT -   mucous membranes  moist , no discharge      NECK - Neck supple  PULM - CTA b/l,  symmetric breath sounds  COR -  RRR, S1 S2, no murmurs  ABD - , ND, NT, soft, no guarding, no rebound, no masses    BACK - no CVA tenderness, nontender spine     EXTREMS - right foot drop   SKIN - no rash or bruising      NEUROLOGIC - alert, sensation nl, motor 5/5 RUE/LUE/RLE/LLE

## 2019-08-19 NOTE — ED STATDOCS - NSFOLLOWUPINSTRUCTIONS_ED_ALL_ED_FT
Peripheral Neuropathy  Peripheral neuropathy is a type of nerve damage. It affects nerves that carry signals between the spinal cord and the arms, legs, and the rest of the body (peripheral nerves). It does not affect nerves in the spinal cord or brain. In peripheral neuropathy, one nerve or a group of nerves may be damaged. Peripheral neuropathy is a broad category that includes many specific nerve disorders, like diabetic neuropathy, hereditary neuropathy, and carpal tunnel syndrome.    What are the causes?  This condition may be caused by:  Diabetes. This is the most common cause of peripheral neuropathy.  Nerve injury.  Pressure or stress on a nerve that lasts a long time.  Lack (deficiency) of B vitamins. This can result from alcoholism, poor diet, or a restricted diet.  Infections.  Autoimmune diseases, such as rheumatoid arthritis and systemic lupus erythematosus.  Nerve diseases that are passed from parent to child (inherited).  Some medicines, such as cancer medicines (chemotherapy).  Poisonous (toxic) substances, such as lead and mercury.  Too little blood flowing to the legs.  Kidney disease.  Thyroid disease.  In some cases, the cause of this condition is not known.    What are the signs or symptoms?  Symptoms of this condition depend on which of your nerves is damaged. Common symptoms include:  Loss of feeling (numbness) in the feet, hands, or both.  Tingling in the feet, hands, or both.  Burning pain.  Very sensitive skin.  Weakness.  Not being able to move a part of the body (paralysis).  Muscle twitching.  Clumsiness or poor coordination.  Loss of balance.  Not being able to control your bladder.  Feeling dizzy.  Sexual problems.  How is this diagnosed?  Diagnosing and finding the cause of peripheral neuropathy can be difficult. Your health care provider will take your medical history and do a physical exam. A neurological exam will also be done. This involves checking things that are affected by your brain, spinal cord, and nerves (nervous system). For example, your health care provider will check your reflexes, how you move, and what you can feel.    You may have other tests, such as:  Blood tests.  Electromyogram (EMG) and nerve conduction tests. These tests check nerve function and how well the nerves are controlling the muscles.  Imaging tests, such as CT scans or MRI to rule out other causes of your symptoms.  Removing a small piece of nerve to be examined in a lab (nerve biopsy). This is rare.  Removing and examining a small amount of the fluid that surrounds the brain and spinal cord (lumbar puncture). This is rare.  How is this treated?  Treatment for this condition may involve:  Treating the underlying cause of the neuropathy, such as diabetes, kidney disease, or vitamin deficiencies.  Stopping medicines that can cause neuropathy, such as chemotherapy.  Medicine to relieve pain. Medicines may include:  Prescription or over-the-counter pain medicine.  Antiseizure medicine.  Antidepressants.  Pain-relieving patches that are applied to painful areas of skin.  Surgery to relieve pressure on a nerve or to destroy a nerve that is causing pain.  Physical therapy to help improve movement and balance.  Devices to help you move around (assistive devices).  Follow these instructions at home:  Medicines     Take over-the-counter and prescription medicines only as told by your health care provider. Do not take any other medicines without first asking your health care provider.  Do not drive or use heavy machinery while taking prescription pain medicine.  Lifestyle     Image   Do not use any products that contain nicotine or tobacco, such as cigarettes and e-cigarettes. Smoking keeps blood from reaching damaged nerves. If you need help quitting, ask your health care provider.  Avoid or limit alcohol. Too much alcohol can cause a vitamin B deficiency, and vitamin B is needed for healthy nerves.  Eat a healthy diet. This includes:  Eating foods that are high in fiber, such as fresh fruits and vegetables, whole grains, and beans.  Limiting foods that are high in fat and processed sugars, such as fried or sweet foods.  General instructions     Image   If you have diabetes, work closely with your health care provider to keep your blood sugar under control.  If you have numbness in your feet:  Check every day for signs of injury or infection. Watch for redness, warmth, and swelling.  Wear padded socks and comfortable shoes. These help protect your feet.  Develop a good support system. Living with peripheral neuropathy can be stressful. Consider talking with a mental health specialist or joining a support group.  Use assistive devices and attend physical therapy as told by your health care provider. This may include using a walker or a cane.  Keep all follow-up visits as told by your health care provider. This is important.  Contact a health care provider if:  You have new signs or symptoms of peripheral neuropathy.  You are struggling emotionally from dealing with peripheral neuropathy.  Your pain is not well-controlled.  Get help right away if:  You have an injury or infection that is not healing normally.  You develop new weakness in an arm or leg.  You fall frequently.  Summary  Peripheral neuropathy is when the nerves in the arms, or legs are damaged, resulting in numbness, weakness, or pain.  There are many causes of peripheral neuropathy, including diabetes, pinched nerves, vitamin deficiencies, autoimmune disease, and hereditary conditions.  Diagnosing and finding the cause of peripheral neuropathy can be difficult. Your health care provider will take your medical history, do a physical exam, and do tests, including blood tests and nerve function tests.  Treatment involves treating the underlying cause of the neuropathy and taking medicines to help control pain. Physical therapy and assistive devices may also help.  This information is not intended to replace advice given to you by your health care provider. Make sure you discuss any questions you have with your health care provider.    Document Released: 12/08/2003 Document Revised: 02/26/2018 Document Reviewed: 02/26/2018  [employer] Patient Education © 2019 [employer]

## 2019-08-19 NOTE — ED ADULT NURSE NOTE - OBJECTIVE STATEMENT
ED c/o right leg pain with new onset numbness and tingling since this morning.  States he has dark urine. +nausea.

## 2019-10-23 ENCOUNTER — OUTPATIENT (OUTPATIENT)
Dept: OUTPATIENT SERVICES | Facility: HOSPITAL | Age: 29
LOS: 1 days | End: 2019-10-23
Payer: MEDICAID

## 2019-10-23 PROCEDURE — 82306 VITAMIN D 25 HYDROXY: CPT

## 2019-10-23 PROCEDURE — 86235 NUCLEAR ANTIGEN ANTIBODY: CPT

## 2019-10-23 PROCEDURE — 36415 COLL VENOUS BLD VENIPUNCTURE: CPT

## 2019-10-23 PROCEDURE — 86431 RHEUMATOID FACTOR QUANT: CPT

## 2019-10-23 PROCEDURE — 83540 ASSAY OF IRON: CPT

## 2019-10-23 PROCEDURE — 84443 ASSAY THYROID STIM HORMONE: CPT

## 2019-10-23 PROCEDURE — 83036 HEMOGLOBIN GLYCOSYLATED A1C: CPT

## 2019-10-23 PROCEDURE — 85027 COMPLETE CBC AUTOMATED: CPT

## 2019-10-23 PROCEDURE — 80053 COMPREHEN METABOLIC PANEL: CPT

## 2019-10-23 PROCEDURE — 86618 LYME DISEASE ANTIBODY: CPT

## 2019-10-23 PROCEDURE — 84425 ASSAY OF VITAMIN B-1: CPT

## 2019-10-23 PROCEDURE — 86039 ANTINUCLEAR ANTIBODIES (ANA): CPT

## 2019-10-23 PROCEDURE — 83921 ORGANIC ACID SINGLE QUANT: CPT

## 2019-10-23 PROCEDURE — 82164 ANGIOTENSIN I ENZYME TEST: CPT

## 2019-10-23 PROCEDURE — 84165 PROTEIN E-PHORESIS SERUM: CPT

## 2019-10-23 PROCEDURE — 87491 CHLMYD TRACH DNA AMP PROBE: CPT

## 2019-10-23 PROCEDURE — 87591 N.GONORRHOEAE DNA AMP PROB: CPT

## 2019-10-23 PROCEDURE — 86592 SYPHILIS TEST NON-TREP QUAL: CPT

## 2019-10-23 PROCEDURE — 86147 CARDIOLIPIN ANTIBODY EA IG: CPT

## 2019-10-23 PROCEDURE — 83880 ASSAY OF NATRIURETIC PEPTIDE: CPT

## 2019-10-23 PROCEDURE — 83550 IRON BINDING TEST: CPT

## 2019-10-23 PROCEDURE — 87340 HEPATITIS B SURFACE AG IA: CPT

## 2019-10-23 PROCEDURE — 87389 HIV-1 AG W/HIV-1&-2 AB AG IA: CPT

## 2019-10-23 PROCEDURE — 84155 ASSAY OF PROTEIN SERUM: CPT

## 2019-10-25 ENCOUNTER — APPOINTMENT (OUTPATIENT)
Dept: ORTHOPEDIC SURGERY | Facility: CLINIC | Age: 29
End: 2019-10-25
Payer: COMMERCIAL

## 2019-10-25 VITALS
HEART RATE: 97 BPM | HEIGHT: 70 IN | BODY MASS INDEX: 25.77 KG/M2 | WEIGHT: 180 LBS | SYSTOLIC BLOOD PRESSURE: 127 MMHG | DIASTOLIC BLOOD PRESSURE: 82 MMHG

## 2019-10-25 DIAGNOSIS — R20.2 PARESTHESIA OF SKIN: ICD-10-CM

## 2019-10-25 DIAGNOSIS — Z86.19 PERSONAL HISTORY OF OTHER INFECTIOUS AND PARASITIC DISEASES: ICD-10-CM

## 2019-10-25 PROCEDURE — 99203 OFFICE O/P NEW LOW 30 MIN: CPT

## 2019-10-26 DIAGNOSIS — R20.2 PARESTHESIA OF SKIN: ICD-10-CM

## 2019-10-28 PROBLEM — Z86.19 HISTORY OF HEPATITIS C: Status: RESOLVED | Noted: 2019-10-28 | Resolved: 2019-10-28

## 2019-10-28 NOTE — DISCUSSION/SUMMARY
[Medication Risks Reviewed] : Medication risks reviewed [de-identified] : He had a sciatic nerve palsy and has had return of hamstring function.  Currently there is no motor power below the knee.  He has been on Lyrica.  He has been getting some return of burning sensation below the knee.  He will continue with his Lyrica and has been started on ibuprofen 800 mg 3 times a day for his current back pain.  He will obtain an AFO to help with his ambulation and I will see him for follow-up in 4 weeks.
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
## 2019-10-28 NOTE — PHYSICAL EXAM
[de-identified] : He ambulates with a steppage gait.  He is fully alert and oriented with a normal mood and affect.  He is in no acute distress.  There are no cutaneous abnormalities or palpable bony defects of the spine.  There is no evidence of shortness of breath or respiratory distress.  His neurologic exam reveals intact quadriceps, iliopsoas and hamstring function.  There is no motor function at all below the left knee.  He has some spotty return of sensation.  Straight leg raising is negative to 90 degrees.  Vascular exam shows no evidence of varicosities and there is no lymphedema.  There are no cutaneous abnormalities of the upper or lower extremities.  His hips and knees have a full range of motion with normal stability.  Left ankle dorsiflexion is currently limited to only neutral. [de-identified] : I reviewed his MRI of the lumbar spine.  There are multiple Schmorl's nodes with disc desiccation indicative of Scheuermann's disease as an adolescent.  There are some minor disc bulges.  There is no evidence of a herniated lumbar disc.

## 2019-11-25 ENCOUNTER — APPOINTMENT (OUTPATIENT)
Dept: ORTHOPEDIC SURGERY | Facility: CLINIC | Age: 29
End: 2019-11-25
Payer: COMMERCIAL

## 2019-11-25 DIAGNOSIS — G89.29 LUMBAGO WITH SCIATICA, RIGHT SIDE: ICD-10-CM

## 2019-11-25 DIAGNOSIS — G57.02 LESION OF SCIATIC NERVE, LEFT LOWER LIMB: ICD-10-CM

## 2019-11-25 DIAGNOSIS — M54.41 LUMBAGO WITH SCIATICA, RIGHT SIDE: ICD-10-CM

## 2019-11-25 PROCEDURE — 99214 OFFICE O/P EST MOD 30 MIN: CPT

## 2019-11-25 RX ORDER — IBUPROFEN 800 MG/1
800 TABLET, FILM COATED ORAL
Qty: 90 | Refills: 0 | Status: ACTIVE | COMMUNITY
Start: 2019-11-25 | End: 1900-01-01

## 2019-11-25 NOTE — PHYSICAL EXAM
[de-identified] : On examination he continues to have no observable motor function below the knee with normal hamstring motor power.  Straight leg raising is negative to 90 degrees bilaterally.  He ambulates with the left leg slightly externally rotated and with a steppage gait.

## 2019-11-25 NOTE — DISCUSSION/SUMMARY
[de-identified] : We again discussed that the ibuprofen was not prescribed as a pain medication but as an anti-inflammatory and that it may take time to work and help the back and right leg pain.  He will arrange to get his AFO brace this week. [Medication Risks Reviewed] : Medication risks reviewed
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
## 2019-12-18 ENCOUNTER — RX RENEWAL (OUTPATIENT)
Age: 29
End: 2019-12-18

## 2020-01-13 ENCOUNTER — APPOINTMENT (OUTPATIENT)
Dept: ORTHOPEDIC SURGERY | Facility: CLINIC | Age: 30
End: 2020-01-13

## 2021-04-28 NOTE — PATIENT PROFILE ADULT - CAREGIVER ADDRESS
same as pt Skyrizi Counseling: I discussed with the patient the risks of risankizumab-rzaa including but not limited to immunosuppression, and serious infections.  The patient understands that monitoring is required including a PPD at baseline and must alert us or the primary physician if symptoms of infection or other concerning signs are noted.

## 2021-05-12 ENCOUNTER — EMERGENCY (EMERGENCY)
Facility: HOSPITAL | Age: 31
LOS: 1 days | Discharge: HOME | End: 2021-05-12
Attending: EMERGENCY MEDICINE
Payer: COMMERCIAL

## 2021-05-12 VITALS
HEART RATE: 103 BPM | OXYGEN SATURATION: 98 % | DIASTOLIC BLOOD PRESSURE: 72 MMHG | RESPIRATION RATE: 16 BRPM | SYSTOLIC BLOOD PRESSURE: 117 MMHG | TEMPERATURE: 98 F

## 2021-05-12 NOTE — ED ADULT NURSE NOTE - OBJECTIVE STATEMENT
[place], was given Narcan and was fully awake ambulatory on arrival to ED   Wanted to walk out right away, explained by physician that we need to observe him  Pt stated took Percocet for back pain and had a few shots of alcohol, mourning his grandmother's passing.

## 2021-05-12 NOTE — ED PROVIDER NOTE - PATIENT PORTAL LINK FT
You can access the FollowMyHealth Patient Portal offered by [facility] by registering at the following website: [email or web address]. By joining [employer]’s FollowMyHealth portal, you will also be able to view your health information using other applications (apps) compatible with our system.

## 2021-05-12 NOTE — ED PROVIDER NOTE - PROGRESS NOTE DETAILS
pt required security to de-escalate his agression. pt has been here now 1 hr and it is now much less likely that he will have re-crudecence of his drowsiness from the percocet from post narcarn metabolism. pt states he will take uber home to [facility]. stable on feet, speech fluid, awake and does not want to stay to sign the papers.

## 2021-05-12 NOTE — ED PROVIDER NOTE - PHYSICAL EXAMINATION
Patient is yelling and not allowing for examination of heart and lungs. Everytime I try to explain to patient the reason that we are worried about him is that the Narcan will wear off and the percocet will start interacting with the alcohol, the patient is unable to repeat that back to me despite explaining it to him five times.

## 2021-05-12 NOTE — ED PROVIDER NOTE - OBJECTIVE STATEMENT
31 yo male presents BIBA from the airport where he was found laying on the floor unresponsive at the gate. EMS attempted to do sternal rub on the patient without response and noted the patient to have a respiration rate of 8. Patient was given Narcan 2 mg with improvement.  Patient reports drinking 6 Roman Walker's, taking Percocet, and using Marijuana. Currently in the ED, patient is very agitated and is requesting to leave.

## 2021-05-12 NOTE — ED PROVIDER NOTE - CLINICAL SUMMARY MEDICAL DECISION MAKING FREE TEXT BOX
29 yo male presents agitated related to intoxication from alcohol, percocet, and marijuana. Need to wait until Narcan wears off - if patient is okay, will release. 31 yo male presents agitated related to intoxication from alcohol, percocet, and marijuana. Need to wait until Narcan wears off - if patient is okay, will release. security at bedside as pt is agitated and demanding to leave. attempt to avoid restraint.

## 2022-01-21 ENCOUNTER — EMERGENCY (EMERGENCY)
Facility: HOSPITAL | Age: 32
LOS: 1 days | Discharge: LEFT BEFORE DISCHARGE | End: 2022-01-21
Attending: EMERGENCY MEDICINE | Admitting: EMERGENCY MEDICINE
Payer: SELF-PAY

## 2022-01-21 VITALS
HEART RATE: 145 BPM | OXYGEN SATURATION: 99 % | RESPIRATION RATE: 17 BRPM | DIASTOLIC BLOOD PRESSURE: 90 MMHG | SYSTOLIC BLOOD PRESSURE: 145 MMHG

## 2022-01-21 VITALS
RESPIRATION RATE: 16 BRPM | WEIGHT: 179.9 LBS | OXYGEN SATURATION: 98 % | HEIGHT: 70 IN | HEART RATE: 140 BPM | TEMPERATURE: 98 F | SYSTOLIC BLOOD PRESSURE: 140 MMHG | DIASTOLIC BLOOD PRESSURE: 89 MMHG

## 2022-01-21 PROCEDURE — 99285 EMERGENCY DEPT VISIT HI MDM: CPT

## 2022-01-21 PROCEDURE — 99284 EMERGENCY DEPT VISIT MOD MDM: CPT

## 2022-01-21 PROCEDURE — 99283 EMERGENCY DEPT VISIT LOW MDM: CPT

## 2022-01-21 NOTE — ED PROVIDER NOTE - PATIENT PORTAL LINK FT
You can access the FollowMyHealth Patient Portal offered by [facility] by registering at the following website: [email or web address]. By joining [employer]’s FollowMyHealth portal, you will also be able to view your health information using other applications (apps) compatible with our system.

## 2022-01-21 NOTE — ED PROVIDER NOTE - PROGRESS NOTE DETAILS
Pt refused any intervention/labs at this time in ED for his condition, Pt in NAD, pt alert and awake, ambulatory, however tachycardic, informed about tachycardia to pt The patient has decided to leave against medical advice (AMA).  ED attending, Dr. Alegria and myself  have made reasonable attempts to explain to the patient that leaving prior to completion of work up and treatment may result in recurrent or worsening of symptoms, severe permanent disability, pain and suffering, harm, injury, and/or death.  I have explained the risks, benefits, and alternatives to treatment as well as the attendant risks of refusing treatment at this time.  The patient has demonstrated comprehension and verbalizes understanding of these risks.  The patient has been told that they must return to the ER immediately for persistent or recurring symptoms, worsening symptoms, or any concerning symptoms.  The patient has also been informed that they may return to the ER immediately at any time if they change their mind and wish to resume care. The patient has been given the opportunity to ask questions and have them fully answered. Pt refused any intervention/labs at this time in ED for his condition, Pt in NAD, pt alert and awake, ambulatory, however tachycardic in 140s, informed about tachycardia to pt and pt states that he is anxious, still refusing to stay and any further treatment or labs at this time. Pt was informed that narcan is short acting and that his symptoms may occur again since unknown what he took and may cause death. Pt agreed to take narcan home. Pt was given Narcan home kit with counselling by pharmacy and discharged to his girlfriend.   The patient has decided to leave against medical advice (AMA).  ED attending, Dr. Alegria and myself  have made reasonable attempts to explain to the patient that leaving prior to completion of work up and treatment may result in recurrent or worsening of symptoms, severe permanent disability, pain and suffering, harm, injury, and/or death.  I have explained the risks, benefits, and alternatives to treatment as well as the attendant risks of refusing treatment at this time.  The patient has demonstrated comprehension and verbalizes understanding of these risks.  The patient has been told that they must return to the ER immediately for persistent or recurring symptoms, worsening symptoms, or any concerning symptoms.  The patient has also been informed that they may return to the ER immediately at any time if they change their mind and wish to resume care. The patient has been given the opportunity to ask questions and have them fully answered.

## 2022-01-21 NOTE — ED ADULT NURSE NOTE - OBJECTIVE STATEMENT
according to EMS pt was [place] unresponsive on train. Rec Narcan 2mg IM & became arousable.     patient stated he took street percocet for his back pain, patient is awake and noted tachycardia on cm, Pt is in no acute distress. patient refused to stay to be observed at this time, MD at bedside, will continue to monitor.

## 2022-01-21 NOTE — ED ADULT NURSE NOTE - EXPLANATION OF PATIENT'S REASON FOR LEAVING
patient refused to stay to be observed longer. patient refused to stay to be observed longer. Narcan kit provided by Amanda Pharmacist, patient has no c/o pain or sob, Pt is in no acute distress. ambulates to dc.

## 2022-01-21 NOTE — ED PROVIDER NOTE - NSFOLLOWUPINSTRUCTIONS_ED_ALL_ED_FT
You are leaving against medical advise. You may return to ED if you change your mind or for any related/worsening symptoms/concerns. You were given narcan for use as directed. Avoid taking any drugs!      Opioid Overdose      Opioids are drugs that are often used to treat pain. Opioids include illegal drugs, such as heroin, as well as prescription pain medicines, such as codeine, morphine, hydrocodone, oxycodone, and fentanyl. An opioid overdose happens when you take too much of an opioid. An overdose may be intentional or accidental and can happen with any type of opioid.    The effects of an overdose can be mild, dangerous, or even deadly. Opioid overdose is a medical emergency.      What are the causes?    This condition may be caused by:  •Taking too much of an opioid on purpose.      •Taking too much of an opioid by accident.      •Using two or more substances that contain opioids at the same time.      •Taking an opioid with a substance that affects your heart, breathing, or blood pressure. These include alcohol, tranquilizers, sleeping pills, illegal drugs, and some over-the-counter medicines.      This condition may also happen due to an error made by:  •A health care provider who prescribes a medicine.      •The pharmacist who fills the prescription order.        What increases the risk?    This condition is more likely in:  •Children. They may be attracted to colorful pills. Because of a child's small size, even a small amount of a drug can be dangerous.      •Older people. They may be taking many different drugs. Older people may have difficulty reading labels or remembering when they last took their medicine. They may also be more sensitive to the effects of opioids.      •People with chronic medical conditions, especially heart, liver, kidney, or neurological diseases.      •People who take an opioid for a long period of time.    •People who use:  •Illegal drugs. IV heroin is especially dangerous.      •Other substances, including alcohol, while using an opioid.      •People who have:  •A history of drug or alcohol abuse.      •Certain mental health conditions.      •A history of previous drug overdoses.        •People who take opioids that are not prescribed for them.        What are the signs or symptoms?    Symptoms of this condition depend on the type of opioid and the amount that was taken. Common symptoms include:  •Sleepiness or difficulty waking from sleep.      •Decrease in attention.      •Confusion.      •Slurred speech.      •Slowed breathing and a slow pulse (bradycardia).      •Nausea and vomiting.      •Abnormally small pupils.      Signs and symptoms that require emergency treatment include:  •Cold, clammy, and pale skin.      •Blue lips and fingernails.      •Vomiting.      •Gurgling sounds in the throat.      •A pulse that is very slow or difficult to detect.      •Breathing that is very irregular, slow, noisy, or difficult to detect.      •Limp body.      •Inability to respond to speech or be awakened from sleep (stupor).      •Seizures.        How is this diagnosed?    This condition is diagnosed based on your symptoms and medical history. It is important to tell your health care provider:  •About all of the opioids that you took.      •When you took the opioids.      •Whether you were drinking alcohol or using marijuana, cocaine, or other drugs.      Your health care provider will do a physical exam. This exam may include:  •Checking and monitoring your heart rate and rhythm, breathing rate, temperature, and blood pressure (vital signs).      •Measuring oxygen levels in your blood.      •Checking for abnormally small pupils.      You may also have blood tests or urine tests. You may have X-rays if you are having severe breathing problems.      How is this treated?    This condition requires immediate medical treatment and hospitalization. Treatment is given in the hospital intensive care (ICU) setting. Supporting your vital signs and your breathing is the first step in treating an opioid overdose. Treatment may also include:  •Giving salts and minerals (electrolytes) along with fluids through an IV.      •Inserting a breathing tube (endotracheal tube) in your airway to help you breathe if you cannot breathe on your own or you are in danger of not being able to breathe on your own.      •Giving oxygen through a small tube under your nose.      •Passing a tube through your nose and into your stomach (nasogastric tube, or NG tube) to empty your stomach.    •Giving medicines that:  •Increase your blood pressure.      •Relieve nausea and vomiting.      •Relieve abdominal pain and cramping.      •Reverse the effects of the opioid (naloxone).        •Monitoring your heart and oxygen levels.      •Ongoing counseling and mental health support if you intentionally overdosed or used an illegal drug.        Follow these instructions at home:     Medicines     •Take over-the-counter and prescription medicines only as told by your health care provider.      •Always ask your health care provider about possible side effects and interactions of any new medicine that you start taking.      •Keep a list of all the medicines that you take, including over-the-counter medicines. Bring this list with you to all your medical visits.      General instructions     •Drink enough fluid to keep your urine pale yellow.      •Keep all follow-up visits as told by your health care provider. This is important.        How is this prevented?    •Read the drug inserts that come with your opioid pain medicines.      •Take medicines only as told by your health care provider. Do not take more medicine than you are told. Do not take medicines more frequently than you are told.      • Do not drink alcohol or take sedatives when taking opioids.      • Do not use illegal or recreational drugs, including cocaine, ecstasy, and marijuana.      • Do not take opioid medicines that are not prescribed for you.      •Store all medicines in safety containers that are out of the reach of children.    •Get help if you are struggling with:  •Alcohol or drug use.      •Depression or another mental health problem.      •Thoughts of hurting yourself or another person.        •Keep the phone number of your local poison control center near your phone or in your mobile phone. In the [place] the hotline of the National Poison Control Center is (402) 216-0181.      •If you were prescribed naloxone, make sure you understand how to take it.        Contact a health care provider if you:    •Need help understanding how to take your pain medicines.      •Feel your medicines are too strong.      •Are concerned that your pain medicines are not working well for your pain.      •Develop new symptoms or side effects when you are taking medicines.        Get help right away if:    •You or someone else is having symptoms of an opioid overdose. Get help even if you are not sure.      •You have serious thoughts about hurting yourself or others.    •You have:  •Chest pain.      •Difficulty breathing.      •A loss of consciousness.        These symptoms may represent a serious problem that is an emergency. Do not wait to see if the symptoms will go away. Get medical help right away. Call your local emergency services (486 in the [place] Do not drive yourself to the hospital.     If you ever feel like you may hurt yourself or others, or have thoughts about taking your own life, get help right away. You can go to your nearest emergency department or call:   • Your local emergency services (422 in the [place]       • A suicide crisis helpline, such as the National Suicide Prevention Lifeline at 1-593.608.1405. This is open 24 hours a day.         Summary    •Opioids are drugs that are often used to treat pain. Opioids include illegal drugs, such as heroin, as well as prescription pain medicines.      •An opioid overdose happens when you take too much of an opioid.      •Overdoses can be intentional or accidental.      •Opioid overdose is very dangerous. It is a life-threatening emergency.      •If you or someone you know is experiencing an opioid overdose, get help right away.      This information is not intended to replace advice given to you by your health care provider. Make sure you discuss any questions you have with your health care provider.

## 2022-01-21 NOTE — ED PROVIDER NOTE - MUSCULOSKELETAL, MLM
Spine appears normal, range of motion is not limited, well healed surgical scar noted to lumbar spine, left foot brace noted from dropped left foot as per pt

## 2022-01-21 NOTE — ED PROVIDER NOTE - ATTENDING CONTRIBUTION TO CARE
30 yo male who was bib ems (we spoke with the provider who treated pt). Pt was [place] [employer] [place] it was heading back to yard by a conductor who was making rounds in the train cars. pt was passed out not breathing and blue.  the train stopped and ems was called  ems gave pt 2 mg Narcan IM and pt slowly responded and then woke up.  on arrival to er pt is refusing to stay, refusing treatment and wants to leave.  He was made aware that narcan is short acting, we don't know what he took and that he may suffer a recurrence of his symptoms and may die.  he was given a Narcan home kit with counselling by pharmacy and discharged to his girlfriend.  he has hx of chronic pain, substance abuse disorder sp numerous back surgeries, spinal fusion and rods with a dropped left foot.  his most recent hospitalization was October to november of 2021 where he was slowly tapered off of his narcotics while in a hospital in [place]   on eval:  tachycardic w male awake alert no distress albeit when he was made aware of his rapid hr he stated he was anxious, and again re-iterated his refusal for any interventions, labs etc.  heent pallor, no  gf r dry mm  neck supple no trauma  cor rapid   chest cta  abd soft nt nd no hsm  ext pt has dropped left foot in foot brace large post midline scar of back  skin as above  per ems this was not a cold exposure due to winter but drug ingestion of unk substance.  he is RMA all except for the narcan pack

## 2022-01-21 NOTE — ED PROVIDER NOTE - REFUSAL OF SERVICE, MDM
The patient has decided to leave against medical advice (AMA).  ED attending, Dr. Alegria and myself  have made reasonable attempts to explain to the patient that leaving prior to completion of work up and treatment may result in recurrent or worsening of symptoms, severe permanent disability, pain and suffering, harm, injury, and/or death.

## 2022-01-21 NOTE — ED PROVIDER NOTE - CLINICAL SUMMARY MEDICAL DECISION MAKING FREE TEXT BOX
32 y/o M with hx of multiple back surgeries, spinal fusion and rods s/p recent lumbar spinal fusion in 10/2021 in [place] where he was tapered off his narcotics, dropped left foot, chronic pain, substance use disorder BIBA sp percocet overdose as per EMS. As per EMS who took care of pt, pt was [place] to on [employer] [place] by conductor and noted that pt was passed out and not breathing and blue. Train was stopped and EMS called. Pt was given 2mg IM Narcan by EMS and pt slowly responded and woke up. Pt denies any prescription pain med or any medication use. Pt states that he was given a "bad pill" which caused his symptoms. Pt states that he feels fine now, slightly anxious and does not want to stay and receive any further treatments. Pt denies other drug use. Last use of alcohol was last night, occasionally smokes cigarettes.

## 2022-01-21 NOTE — ED ADULT TRIAGE NOTE - CHIEF COMPLAINT QUOTE
according to EMS pt was found unresponsive on train. Rec Narcan 2mg IM & became arousable. Upon arrival to ED is A & O States he OD on Percocet

## 2022-01-21 NOTE — PHARMACOTHERAPY INTERVENTION NOTE - COMMENTS
Narcan Emergency kit Education   Educated pt about Narcan Spray use in  to reverse suspected opioid Overdose how it knocks off receptor   explained pt may respond with agitation to convey to  of dose.    Reviewed proper administration technique - demonstrated placement  in nars and showed kit package contents   provided quick user guide for narcan spray     Reviewed dose interval for initial response 2mins may repeated dose if needed   Explained if no response not opoid   Reviewed  possible need to re-dosing after initial treatment narcan dur ration 60-90mins   asked pt multiple time if understands and had any questions   pt expressed understanding

## 2022-01-21 NOTE — ED PROVIDER NOTE - OBJECTIVE STATEMENT
30 y/o M with hx of multiple back surgeries, spinal fusion and rods s/p recent lumbar spinal fusion in 10/2021 in [place] where he was tapered off his narcotics, dropped left foot, chronic pain, substance use disorder BIBA sp percocet overdose as per EMS. As per EMS who took care of pt, pt was [place] to on [employer] [place] by conductor and noted that pt was passed out and not breathing and blue. Train was stopped and EMS called. Pt was given 2mg IM Narcan by EMS and pt slowly responded and woke up. Pt denies any prescription pain med or any medication use. Pt states that he was given a "bad pill" which caused his symptoms. Pt states that he feels fine now, slightly anxious and does not want to stay and receive any further treatments.

## 2023-02-17 NOTE — ED ADULT NURSE NOTE - NSSISCREENINGQ3_ED_A_ED
If he develops fever, vomiting, or worsening symptoms, return to the emergency department.  Please follow-up with your pediatrician to make sure there is full resolution and appropriate follow-up.  
No

## 2023-04-03 NOTE — PROGRESS NOTE ADULT - SUBJECTIVE AND OBJECTIVE BOX
28M.  presenteing to ED after heroin overdose.  In his room last night and passed out sitting on the floor.  He woke up in the morning w/ numbness and tingling in his legs.  Was unable to ambulate. Continues to c/o b/l foot numbness and pain.   n ED, CPK 33K.  given 2L of isotonic saline.  Renal eval for rhabdomyloysis    today     improved LE movement in upper legs but still unable to flex or extend left foot       MEDICATIONS  (STANDING):  clonazePAM Tablet 1 milliGRAM(s) Oral two times a day  enoxaparin Injectable 40 milliGRAM(s) SubCutaneous every 24 hours  melatonin 5 milliGRAM(s) Oral at bedtime  naloxone Injectable 0.4 milliGRAM(s) IV Push once  nicotine -  14 mG/24Hr(s) Patch 1 patch Transdermal daily  sodium bicarbonate  Infusion 0.134 mEq/kG/Hr (150 mL/Hr) IV Continuous <Continuous>    MEDICATIONS  (PRN):  acetaminophen   Tablet .. 650 milliGRAM(s) Oral every 6 hours PRN Mild Pain (1 - 3)  loperamide 2 milliGRAM(s) Oral every 4 hours PRN After each loose stool as needed for diarrhea  methadone    Tablet 5 milliGRAM(s) Oral every 4 hours PRN per guideline  methadone    Tablet 5 milliGRAM(s) Oral every 4 hours PRN per guideline  prochlorperazine   Tablet 10 milliGRAM(s) Oral every 8 hours PRN Nausea and/or Vomiting      ICU Vital Signs Last 24 Hrs  T(C): 37 (04 Mar 2019 12:26), Max: 37.2 (03 Mar 2019 21:45)  T(F): 98.6 (04 Mar 2019 12:26), Max: 98.9 (03 Mar 2019 21:45)  HR: 66 (04 Mar 2019 12:26) (65 - 86)  BP: 116/59 (04 Mar 2019 12:26) (105/54 - 131/99)  BP(mean): --  ABP: --  ABP(mean): --  RR: 18 (04 Mar 2019 12:26) (16 - 18)  SpO2: 100% (04 Mar 2019 12:26) (97% - 100%)      PHYSICAL EXAM:  Alert and appropriate  GENERAL: No distress  CHEST/LUNG: Clear to aus  HEART: S1S2 RRR  ABDOMEN: soft  EXTREMITIES: no edema  SKIN:     LABS:               140    |  108    |  7      ----------------------------<  100       04 Mar 2019 07:24  4.0     |  26     |  0.62     140    |  107    |  6      ----------------------------<  116       03 Mar 2019 07:31  3.9     |  25     |  0.47     140    |  107    |  7      ----------------------------<  142       02 Mar 2019 07:12  3.9     |  24     |  0.64     Ca    8.4        04 Mar 2019 07:24  Ca    8.1        03 Mar 2019 07:31    Phos  3.9       04 Mar 2019 07:24  Phos  3.8       03 Mar 2019 07:31    Mg     2.2       04 Mar 2019 07:24  Mg     2.2       03 Mar 2019 07:31      Creatine Kinase, Serum: 9875 U/L (19 @ 07:24)    Urinalysis Basic - ( 01 Mar 2019 15:45 )    Color: Zara / Appearance: Clear / S.015 / pH: x  Gluc: x / Ketone: Moderate  / Bili: Negative / Urobili: 1 mg/dL   Blood: x / Protein: 100 mg/dL / Nitrite: Positive   Leuk Esterase: Trace / RBC: Negative /HPF / WBC 0-2   Sq Epi: x / Non Sq Epi: Occasional / Bacteria: Occasional        RADIOLOGY & ADDITIONAL TESTS: Prednisone Pregnancy And Lactation Text: This medication is Pregnancy Category C and it isn't know if it is safe during pregnancy. This medication is excreted in breast milk.

## 2023-05-17 NOTE — ED STATDOCS - [facility] ED CONTEXT
recent trauma

## 2024-05-13 NOTE — PATIENT PROFILE ADULT - HAS THE PATIENT RECEIVED THE INFLUENZA VACCINE THIS SEASON?
Thank you for your visit today!    Please follow up with Dr. Weaver in2 months for post void residual and flow.      Start flomax and finasteride   After Your Cystoscopy    You have undergone a cystoscopy. Your doctor has inserted a telescope into your urinary bladder through your urethra to view the inside of your bladder and/or urethra.    What to Expect  Possible burning during urination and/or blood tinged urine.    What to Do  Resume normal activity and medications (avoid aspirin for 3 days).  Drink 6-8 glasses of fluid each day for 3 days to help flush your urinary system.    Medications  If an antibiotic is prescribed, take it until all the medication is gone (if you miss a dose, continue when you remember and finish the medication completely).  Other medication instructions - see the print out given to you by the clinic nurse.    When to Call the Doctor  If you have a fever above 100 degrees Fahrenheit.  If you are unable to urinate.  If blood dots form in your urine.  If your urine becomes very bloody and does not clear with drinking extra fluids.    Between the hours of 8:30 a.m. and 4:30 p.m. call the clinic at [phone].    
no...

## 2024-06-04 NOTE — PHYSICAL THERAPY INITIAL EVALUATION ADULT - BALANCE DISTURBANCE, SYSTEM IMPAIRMENT CONTRIBUTE, REHAB EVAL
neuromuscular/musculoskeletal

## 2024-06-22 NOTE — ED ADULT NURSE NOTE - GENITOURINARY ASSESSMENT
as per pts father pt has been vomiting before and after meals x 2 weeks, today had episode of diarrhea and vomiting. - - -

## 2024-07-09 NOTE — DISCHARGE NOTE ADULT - NS AS DC FU INST LIST INST
----- Message from Sherif Steen MD sent at 7/9/2024  9:06 AM CDT -----  Regarding: new patient evaluation for trigeminal neuralgia  This patient saw neuro NP last month and was referred for possible surgical intervention for right trigeminal neuralgia. Can you contact patient's daughter, Baylee, ([phone]) to schedule this evaluation? Thank you  
no

## 2024-09-19 NOTE — DIETITIAN INITIAL EVALUATION ADULT. - NS FNS WEIGHT USED FOR CALC
No protocol for requested medication.    Medication: mirabegron ER (Myrbetriq) 50 MG 24 hr tablet   Last office visit date:   08/27/2024       Pharmacy: [id number] - [place], [place] - [address]    Order pended, routed to clinician for review.   
usual/79.5

## 2024-11-15 NOTE — ED ADULT NURSE NOTE - NSSISCREENINGQ2_ED_A_ED
Community Resource Referral  Referral Source: EFRAIN Cruz NP  Identified Need: PCP services, adult , home care  Agency Name: [facility]  Referral Placed via:  Referral has not yet been placed, as patient and [facility]'s contact information were found to be out of service upon attempt to call to inquire if patient would be agreeable to referral being sent.  Additional Information: Patient scheduled to return 12/24. SW will plan to touch base with patient at that time to inquire if he is agreeable to referral at that time.    SW will continue to follow.      No